# Patient Record
Sex: MALE | Race: BLACK OR AFRICAN AMERICAN | NOT HISPANIC OR LATINO | Employment: FULL TIME | URBAN - METROPOLITAN AREA
[De-identification: names, ages, dates, MRNs, and addresses within clinical notes are randomized per-mention and may not be internally consistent; named-entity substitution may affect disease eponyms.]

---

## 2023-03-17 ENCOUNTER — OFFICE VISIT (OUTPATIENT)
Dept: URGENT CARE | Facility: CLINIC | Age: 33
End: 2023-03-17

## 2023-03-17 VITALS
HEART RATE: 86 BPM | SYSTOLIC BLOOD PRESSURE: 138 MMHG | DIASTOLIC BLOOD PRESSURE: 90 MMHG | HEIGHT: 66 IN | OXYGEN SATURATION: 95 % | RESPIRATION RATE: 16 BRPM | TEMPERATURE: 97.6 F

## 2023-03-17 DIAGNOSIS — Z02.89 ENCOUNTER FOR PHYSICAL EXAMINATION RELATED TO EMPLOYMENT: ICD-10-CM

## 2023-03-17 DIAGNOSIS — R03.0 ELEVATED BLOOD PRESSURE READING: ICD-10-CM

## 2023-03-17 PROCEDURE — 8915 PR COMPREHENSIVE PHYSICAL: Performed by: NURSE PRACTITIONER

## 2023-08-31 ENCOUNTER — NON-PROVIDER VISIT (OUTPATIENT)
Dept: URGENT CARE | Facility: CLINIC | Age: 33
End: 2023-08-31

## 2023-08-31 DIAGNOSIS — Z02.1 PRE-EMPLOYMENT DRUG SCREENING: ICD-10-CM

## 2023-08-31 LAB
AMP AMPHETAMINE: NORMAL
COC COCAINE: NORMAL
INT CON NEG: NORMAL
INT CON POS: NORMAL
MET METHAMPHETAMINES: NORMAL
OPI OPIATES: NORMAL
PCP PHENCYCLIDINE: NORMAL
POC DRUG COMMENT 753798-OCCUPATIONAL HEALTH: NEGATIVE
THC: NORMAL

## 2023-08-31 PROCEDURE — 80305 DRUG TEST PRSMV DIR OPT OBS: CPT | Performed by: NURSE PRACTITIONER

## 2024-01-23 ENCOUNTER — APPOINTMENT (OUTPATIENT)
Dept: RADIOLOGY | Facility: MEDICAL CENTER | Age: 34
DRG: 158 | End: 2024-01-23
Attending: STUDENT IN AN ORGANIZED HEALTH CARE EDUCATION/TRAINING PROGRAM
Payer: COMMERCIAL

## 2024-01-23 ENCOUNTER — HOSPITAL ENCOUNTER (INPATIENT)
Facility: MEDICAL CENTER | Age: 34
LOS: 2 days | DRG: 158 | End: 2024-01-25
Attending: STUDENT IN AN ORGANIZED HEALTH CARE EDUCATION/TRAINING PROGRAM | Admitting: INTERNAL MEDICINE
Payer: COMMERCIAL

## 2024-01-23 ENCOUNTER — HOSPITAL ENCOUNTER (OUTPATIENT)
Dept: RADIOLOGY | Facility: MEDICAL CENTER | Age: 34
End: 2024-01-23

## 2024-01-23 DIAGNOSIS — K04.7 DENTAL INFECTION: ICD-10-CM

## 2024-01-23 DIAGNOSIS — K12.1 BACTERIAL ORAL INFECTION: ICD-10-CM

## 2024-01-23 DIAGNOSIS — B96.89 BACTERIAL ORAL INFECTION: ICD-10-CM

## 2024-01-23 DIAGNOSIS — J98.8 AIRWAY COMPROMISE: ICD-10-CM

## 2024-01-23 DIAGNOSIS — A41.9 SEPSIS, DUE TO UNSPECIFIED ORGANISM, UNSPECIFIED WHETHER ACUTE ORGAN DYSFUNCTION PRESENT (HCC): ICD-10-CM

## 2024-01-23 PROBLEM — E66.01 MORBID OBESITY (HCC): Status: ACTIVE | Noted: 2024-01-23

## 2024-01-23 PROBLEM — R73.9 HYPERGLYCEMIA: Status: ACTIVE | Noted: 2024-01-23

## 2024-01-23 LAB
ALBUMIN SERPL BCP-MCNC: 4.1 G/DL (ref 3.2–4.9)
ALBUMIN/GLOB SERPL: 1.1 G/DL
ALP SERPL-CCNC: 85 U/L (ref 30–99)
ALT SERPL-CCNC: 15 U/L (ref 2–50)
ANION GAP SERPL CALC-SCNC: 11 MMOL/L (ref 7–16)
AST SERPL-CCNC: 11 U/L (ref 12–45)
BASOPHILS # BLD AUTO: 0.2 % (ref 0–1.8)
BASOPHILS # BLD: 0.03 K/UL (ref 0–0.12)
BILIRUB SERPL-MCNC: 0.4 MG/DL (ref 0.1–1.5)
BUN SERPL-MCNC: 11 MG/DL (ref 8–22)
CALCIUM ALBUM COR SERPL-MCNC: 8.8 MG/DL (ref 8.5–10.5)
CALCIUM SERPL-MCNC: 8.9 MG/DL (ref 8.5–10.5)
CHLORIDE SERPL-SCNC: 102 MMOL/L (ref 96–112)
CO2 SERPL-SCNC: 21 MMOL/L (ref 20–33)
CREAT SERPL-MCNC: 0.88 MG/DL (ref 0.5–1.4)
EOSINOPHIL # BLD AUTO: 0.02 K/UL (ref 0–0.51)
EOSINOPHIL NFR BLD: 0.1 % (ref 0–6.9)
ERYTHROCYTE [DISTWIDTH] IN BLOOD BY AUTOMATED COUNT: 38.8 FL (ref 35.9–50)
FLUAV RNA SPEC QL NAA+PROBE: NEGATIVE
FLUBV RNA SPEC QL NAA+PROBE: NEGATIVE
GFR SERPLBLD CREATININE-BSD FMLA CKD-EPI: 115 ML/MIN/1.73 M 2
GLOBULIN SER CALC-MCNC: 3.7 G/DL (ref 1.9–3.5)
GLUCOSE SERPL-MCNC: 111 MG/DL (ref 65–99)
HCT VFR BLD AUTO: 44.7 % (ref 42–52)
HGB BLD-MCNC: 15.5 G/DL (ref 14–18)
IMM GRANULOCYTES # BLD AUTO: 0.1 K/UL (ref 0–0.11)
IMM GRANULOCYTES NFR BLD AUTO: 0.6 % (ref 0–0.9)
LACTATE SERPL-SCNC: 1 MMOL/L (ref 0.5–2)
LYMPHOCYTES # BLD AUTO: 1.45 K/UL (ref 1–4.8)
LYMPHOCYTES NFR BLD: 8.9 % (ref 22–41)
MCH RBC QN AUTO: 30.2 PG (ref 27–33)
MCHC RBC AUTO-ENTMCNC: 34.7 G/DL (ref 32.3–36.5)
MCV RBC AUTO: 87 FL (ref 81.4–97.8)
MONOCYTES # BLD AUTO: 0.24 K/UL (ref 0–0.85)
MONOCYTES NFR BLD AUTO: 1.5 % (ref 0–13.4)
NEUTROPHILS # BLD AUTO: 14.49 K/UL (ref 1.82–7.42)
NEUTROPHILS NFR BLD: 88.7 % (ref 44–72)
NRBC # BLD AUTO: 0 K/UL
NRBC BLD-RTO: 0 /100 WBC (ref 0–0.2)
PLATELET # BLD AUTO: 229 K/UL (ref 164–446)
PMV BLD AUTO: 11.4 FL (ref 9–12.9)
POTASSIUM SERPL-SCNC: 4 MMOL/L (ref 3.6–5.5)
PROT SERPL-MCNC: 7.8 G/DL (ref 6–8.2)
RBC # BLD AUTO: 5.14 M/UL (ref 4.7–6.1)
RSV RNA SPEC QL NAA+PROBE: NEGATIVE
SARS-COV-2 RNA RESP QL NAA+PROBE: NOTDETECTED
SODIUM SERPL-SCNC: 134 MMOL/L (ref 135–145)
WBC # BLD AUTO: 16.3 K/UL (ref 4.8–10.8)

## 2024-01-23 PROCEDURE — 96365 THER/PROPH/DIAG IV INF INIT: CPT

## 2024-01-23 PROCEDURE — 99285 EMERGENCY DEPT VISIT HI MDM: CPT

## 2024-01-23 PROCEDURE — 83605 ASSAY OF LACTIC ACID: CPT

## 2024-01-23 PROCEDURE — 85025 COMPLETE CBC W/AUTO DIFF WBC: CPT

## 2024-01-23 PROCEDURE — 71045 X-RAY EXAM CHEST 1 VIEW: CPT

## 2024-01-23 PROCEDURE — 99223 1ST HOSP IP/OBS HIGH 75: CPT | Performed by: INTERNAL MEDICINE

## 2024-01-23 PROCEDURE — 700111 HCHG RX REV CODE 636 W/ 250 OVERRIDE (IP): Mod: JZ | Performed by: STUDENT IN AN ORGANIZED HEALTH CARE EDUCATION/TRAINING PROGRAM

## 2024-01-23 PROCEDURE — 36415 COLL VENOUS BLD VENIPUNCTURE: CPT

## 2024-01-23 PROCEDURE — 70355 PANORAMIC X-RAY OF JAWS: CPT

## 2024-01-23 PROCEDURE — 700105 HCHG RX REV CODE 258: Performed by: STUDENT IN AN ORGANIZED HEALTH CARE EDUCATION/TRAINING PROGRAM

## 2024-01-23 PROCEDURE — 0241U HCHG SARS-COV-2 COVID-19 NFCT DS RESP RNA 4 TRGT ED POC: CPT

## 2024-01-23 PROCEDURE — 770000 HCHG ROOM/CARE - INTERMEDIATE ICU *

## 2024-01-23 PROCEDURE — 80053 COMPREHEN METABOLIC PANEL: CPT

## 2024-01-23 PROCEDURE — 87040 BLOOD CULTURE FOR BACTERIA: CPT

## 2024-01-23 RX ORDER — ONDANSETRON 4 MG/1
4 TABLET, ORALLY DISINTEGRATING ORAL EVERY 4 HOURS PRN
Status: DISCONTINUED | OUTPATIENT
Start: 2024-01-23 | End: 2024-01-25 | Stop reason: HOSPADM

## 2024-01-23 RX ORDER — BISACODYL 10 MG
10 SUPPOSITORY, RECTAL RECTAL
Status: DISCONTINUED | OUTPATIENT
Start: 2024-01-23 | End: 2024-01-25 | Stop reason: HOSPADM

## 2024-01-23 RX ORDER — DEXAMETHASONE SODIUM PHOSPHATE 4 MG/ML
4 INJECTION, SOLUTION INTRA-ARTICULAR; INTRALESIONAL; INTRAMUSCULAR; INTRAVENOUS; SOFT TISSUE EVERY 6 HOURS
Status: DISCONTINUED | OUTPATIENT
Start: 2024-01-24 | End: 2024-01-25 | Stop reason: HOSPADM

## 2024-01-23 RX ORDER — IBUPROFEN 200 MG
400 TABLET ORAL EVERY 6 HOURS PRN
Status: DISCONTINUED | OUTPATIENT
Start: 2024-01-23 | End: 2024-01-23

## 2024-01-23 RX ORDER — SODIUM CHLORIDE 9 MG/ML
INJECTION, SOLUTION INTRAVENOUS CONTINUOUS
Status: DISCONTINUED | OUTPATIENT
Start: 2024-01-23 | End: 2024-01-24

## 2024-01-23 RX ORDER — PROMETHAZINE HYDROCHLORIDE 25 MG/1
12.5-25 TABLET ORAL EVERY 4 HOURS PRN
Status: DISCONTINUED | OUTPATIENT
Start: 2024-01-23 | End: 2024-01-25 | Stop reason: HOSPADM

## 2024-01-23 RX ORDER — POLYETHYLENE GLYCOL 3350 17 G/17G
1 POWDER, FOR SOLUTION ORAL
Status: DISCONTINUED | OUTPATIENT
Start: 2024-01-23 | End: 2024-01-25 | Stop reason: HOSPADM

## 2024-01-23 RX ORDER — PROMETHAZINE HYDROCHLORIDE 25 MG/1
12.5-25 SUPPOSITORY RECTAL EVERY 4 HOURS PRN
Status: DISCONTINUED | OUTPATIENT
Start: 2024-01-23 | End: 2024-01-25 | Stop reason: HOSPADM

## 2024-01-23 RX ORDER — HYDROMORPHONE HYDROCHLORIDE 1 MG/ML
0.5 INJECTION, SOLUTION INTRAMUSCULAR; INTRAVENOUS; SUBCUTANEOUS
Status: DISCONTINUED | OUTPATIENT
Start: 2024-01-23 | End: 2024-01-25 | Stop reason: HOSPADM

## 2024-01-23 RX ORDER — ONDANSETRON 2 MG/ML
4 INJECTION INTRAMUSCULAR; INTRAVENOUS EVERY 4 HOURS PRN
Status: DISCONTINUED | OUTPATIENT
Start: 2024-01-23 | End: 2024-01-25 | Stop reason: HOSPADM

## 2024-01-23 RX ORDER — ENOXAPARIN SODIUM 100 MG/ML
60 INJECTION SUBCUTANEOUS 2 TIMES DAILY
Status: DISCONTINUED | OUTPATIENT
Start: 2024-01-24 | End: 2024-01-25 | Stop reason: HOSPADM

## 2024-01-23 RX ORDER — PROCHLORPERAZINE EDISYLATE 5 MG/ML
5-10 INJECTION INTRAMUSCULAR; INTRAVENOUS EVERY 4 HOURS PRN
Status: DISCONTINUED | OUTPATIENT
Start: 2024-01-23 | End: 2024-01-25 | Stop reason: HOSPADM

## 2024-01-23 RX ORDER — SODIUM CHLORIDE 9 MG/ML
30 INJECTION, SOLUTION INTRAVENOUS ONCE
Status: COMPLETED | OUTPATIENT
Start: 2024-01-23 | End: 2024-01-23

## 2024-01-23 RX ORDER — LABETALOL HYDROCHLORIDE 5 MG/ML
10 INJECTION, SOLUTION INTRAVENOUS EVERY 4 HOURS PRN
Status: DISCONTINUED | OUTPATIENT
Start: 2024-01-23 | End: 2024-01-25 | Stop reason: HOSPADM

## 2024-01-23 RX ORDER — ACETAMINOPHEN 500 MG
500 TABLET ORAL EVERY 6 HOURS PRN
COMMUNITY
End: 2024-02-08

## 2024-01-23 RX ORDER — OXYCODONE HYDROCHLORIDE 10 MG/1
10 TABLET ORAL
Status: DISCONTINUED | OUTPATIENT
Start: 2024-01-23 | End: 2024-01-25 | Stop reason: HOSPADM

## 2024-01-23 RX ORDER — AMOXICILLIN 250 MG
2 CAPSULE ORAL 2 TIMES DAILY
Status: DISCONTINUED | OUTPATIENT
Start: 2024-01-23 | End: 2024-01-25 | Stop reason: HOSPADM

## 2024-01-23 RX ORDER — ACETAMINOPHEN 325 MG/1
650 TABLET ORAL EVERY 6 HOURS PRN
Status: DISCONTINUED | OUTPATIENT
Start: 2024-01-23 | End: 2024-01-25 | Stop reason: HOSPADM

## 2024-01-23 RX ORDER — OXYCODONE HYDROCHLORIDE 5 MG/1
5 TABLET ORAL
Status: DISCONTINUED | OUTPATIENT
Start: 2024-01-23 | End: 2024-01-25 | Stop reason: HOSPADM

## 2024-01-23 RX ORDER — IBUPROFEN 200 MG
400 TABLET ORAL EVERY 6 HOURS PRN
COMMUNITY
End: 2024-02-08

## 2024-01-23 RX ADMIN — AMPICILLIN AND SULBACTAM 3 G: 1; 2 INJECTION, POWDER, FOR SOLUTION INTRAMUSCULAR; INTRAVENOUS at 21:47

## 2024-01-23 RX ADMIN — SODIUM CHLORIDE 1914 ML: 9 INJECTION, SOLUTION INTRAVENOUS at 21:47

## 2024-01-23 ASSESSMENT — ENCOUNTER SYMPTOMS
COUGH: 0
HALLUCINATIONS: 0
NAUSEA: 0
FEVER: 0
PALPITATIONS: 0
PHOTOPHOBIA: 0
FOCAL WEAKNESS: 0
SORE THROAT: 1
SPEECH CHANGE: 0
WEIGHT LOSS: 0
SPUTUM PRODUCTION: 0
POLYDIPSIA: 0
TREMORS: 0
VOMITING: 0
NERVOUS/ANXIOUS: 0
HEMOPTYSIS: 0
CHILLS: 0
BRUISES/BLEEDS EASILY: 0
NECK PAIN: 0
BACK PAIN: 0
HEADACHES: 0
HEARTBURN: 0
DOUBLE VISION: 0
BLURRED VISION: 0
ORTHOPNEA: 0
FLANK PAIN: 0

## 2024-01-23 ASSESSMENT — FIBROSIS 4 INDEX: FIB4 SCORE: 0.42

## 2024-01-23 ASSESSMENT — LIFESTYLE VARIABLES: SUBSTANCE_ABUSE: 0

## 2024-01-24 ENCOUNTER — APPOINTMENT (OUTPATIENT)
Dept: RADIOLOGY | Facility: MEDICAL CENTER | Age: 34
DRG: 158 | End: 2024-01-24
Attending: DENTIST
Payer: COMMERCIAL

## 2024-01-24 LAB
ALBUMIN SERPL BCP-MCNC: 3.8 G/DL (ref 3.2–4.9)
ALBUMIN/GLOB SERPL: 1.2 G/DL
ALP SERPL-CCNC: 75 U/L (ref 30–99)
ALT SERPL-CCNC: 15 U/L (ref 2–50)
ANION GAP SERPL CALC-SCNC: 10 MMOL/L (ref 7–16)
APPEARANCE UR: ABNORMAL
AST SERPL-CCNC: 9 U/L (ref 12–45)
BACTERIA #/AREA URNS HPF: ABNORMAL /HPF
BASOPHILS # BLD AUTO: 0.2 % (ref 0–1.8)
BASOPHILS # BLD: 0.03 K/UL (ref 0–0.12)
BILIRUB SERPL-MCNC: 0.3 MG/DL (ref 0.1–1.5)
BILIRUB UR QL STRIP.AUTO: NEGATIVE
BUN SERPL-MCNC: 12 MG/DL (ref 8–22)
CALCIUM ALBUM COR SERPL-MCNC: 8.5 MG/DL (ref 8.5–10.5)
CALCIUM SERPL-MCNC: 8.3 MG/DL (ref 8.5–10.5)
CHLORIDE SERPL-SCNC: 104 MMOL/L (ref 96–112)
CO2 SERPL-SCNC: 20 MMOL/L (ref 20–33)
COLOR UR: YELLOW
CREAT SERPL-MCNC: 0.75 MG/DL (ref 0.5–1.4)
EOSINOPHIL # BLD AUTO: 0 K/UL (ref 0–0.51)
EOSINOPHIL NFR BLD: 0 % (ref 0–6.9)
EPI CELLS #/AREA URNS HPF: ABNORMAL /HPF
ERYTHROCYTE [DISTWIDTH] IN BLOOD BY AUTOMATED COUNT: 38.5 FL (ref 35.9–50)
EST. AVERAGE GLUCOSE BLD GHB EST-MCNC: 123 MG/DL
GFR SERPLBLD CREATININE-BSD FMLA CKD-EPI: 121 ML/MIN/1.73 M 2
GLOBULIN SER CALC-MCNC: 3.2 G/DL (ref 1.9–3.5)
GLUCOSE SERPL-MCNC: 129 MG/DL (ref 65–99)
GLUCOSE UR STRIP.AUTO-MCNC: NEGATIVE MG/DL
HBA1C MFR BLD: 5.9 % (ref 4–5.6)
HCT VFR BLD AUTO: 42.4 % (ref 42–52)
HGB BLD-MCNC: 14.4 G/DL (ref 14–18)
HYALINE CASTS #/AREA URNS LPF: ABNORMAL /LPF
IMM GRANULOCYTES # BLD AUTO: 0.1 K/UL (ref 0–0.11)
IMM GRANULOCYTES NFR BLD AUTO: 0.6 % (ref 0–0.9)
KETONES UR STRIP.AUTO-MCNC: ABNORMAL MG/DL
LACTATE SERPL-SCNC: 0.9 MMOL/L (ref 0.5–2)
LEUKOCYTE ESTERASE UR QL STRIP.AUTO: ABNORMAL
LYMPHOCYTES # BLD AUTO: 1.26 K/UL (ref 1–4.8)
LYMPHOCYTES NFR BLD: 7 % (ref 22–41)
MCH RBC QN AUTO: 29.6 PG (ref 27–33)
MCHC RBC AUTO-ENTMCNC: 34 G/DL (ref 32.3–36.5)
MCV RBC AUTO: 87.2 FL (ref 81.4–97.8)
MICRO URNS: ABNORMAL
MONOCYTES # BLD AUTO: 0.2 K/UL (ref 0–0.85)
MONOCYTES NFR BLD AUTO: 1.1 % (ref 0–13.4)
NEUTROPHILS # BLD AUTO: 16.45 K/UL (ref 1.82–7.42)
NEUTROPHILS NFR BLD: 91.1 % (ref 44–72)
NITRITE UR QL STRIP.AUTO: NEGATIVE
NRBC # BLD AUTO: 0 K/UL
NRBC BLD-RTO: 0 /100 WBC (ref 0–0.2)
PH UR STRIP.AUTO: 6 [PH] (ref 5–8)
PLATELET # BLD AUTO: 208 K/UL (ref 164–446)
PMV BLD AUTO: 11.4 FL (ref 9–12.9)
POTASSIUM SERPL-SCNC: 4 MMOL/L (ref 3.6–5.5)
PROT SERPL-MCNC: 7 G/DL (ref 6–8.2)
PROT UR QL STRIP: 30 MG/DL
RBC # BLD AUTO: 4.86 M/UL (ref 4.7–6.1)
RBC # URNS HPF: ABNORMAL /HPF
RBC UR QL AUTO: ABNORMAL
SODIUM SERPL-SCNC: 134 MMOL/L (ref 135–145)
SP GR UR STRIP.AUTO: >=1.045
UROBILINOGEN UR STRIP.AUTO-MCNC: 0.2 MG/DL
WBC # BLD AUTO: 18 K/UL (ref 4.8–10.8)
WBC #/AREA URNS HPF: ABNORMAL /HPF

## 2024-01-24 PROCEDURE — 80053 COMPREHEN METABOLIC PANEL: CPT

## 2024-01-24 PROCEDURE — 83036 HEMOGLOBIN GLYCOSYLATED A1C: CPT

## 2024-01-24 PROCEDURE — 83605 ASSAY OF LACTIC ACID: CPT

## 2024-01-24 PROCEDURE — 87086 URINE CULTURE/COLONY COUNT: CPT

## 2024-01-24 PROCEDURE — 700105 HCHG RX REV CODE 258: Performed by: INTERNAL MEDICINE

## 2024-01-24 PROCEDURE — 81001 URINALYSIS AUTO W/SCOPE: CPT

## 2024-01-24 PROCEDURE — 770006 HCHG ROOM/CARE - MED/SURG/GYN SEMI*

## 2024-01-24 PROCEDURE — 70355 PANORAMIC X-RAY OF JAWS: CPT

## 2024-01-24 PROCEDURE — 700111 HCHG RX REV CODE 636 W/ 250 OVERRIDE (IP): Mod: JZ | Performed by: INTERNAL MEDICINE

## 2024-01-24 PROCEDURE — 85025 COMPLETE CBC W/AUTO DIFF WBC: CPT

## 2024-01-24 PROCEDURE — 99232 SBSQ HOSP IP/OBS MODERATE 35: CPT | Performed by: HOSPITALIST

## 2024-01-24 RX ADMIN — DEXAMETHASONE SODIUM PHOSPHATE 4 MG: 4 INJECTION INTRA-ARTICULAR; INTRALESIONAL; INTRAMUSCULAR; INTRAVENOUS; SOFT TISSUE at 16:23

## 2024-01-24 RX ADMIN — DEXAMETHASONE SODIUM PHOSPHATE 4 MG: 4 INJECTION INTRA-ARTICULAR; INTRALESIONAL; INTRAMUSCULAR; INTRAVENOUS; SOFT TISSUE at 05:14

## 2024-01-24 RX ADMIN — DEXAMETHASONE SODIUM PHOSPHATE 4 MG: 4 INJECTION INTRA-ARTICULAR; INTRALESIONAL; INTRAMUSCULAR; INTRAVENOUS; SOFT TISSUE at 23:24

## 2024-01-24 RX ADMIN — ENOXAPARIN SODIUM 60 MG: 100 INJECTION SUBCUTANEOUS at 05:14

## 2024-01-24 RX ADMIN — AMPICILLIN SODIUM, SULBACTAM SODIUM 3 G: 2; 1 INJECTION, POWDER, FOR SOLUTION INTRAMUSCULAR; INTRAVENOUS at 10:02

## 2024-01-24 RX ADMIN — AMPICILLIN SODIUM, SULBACTAM SODIUM 3 G: 2; 1 INJECTION, POWDER, FOR SOLUTION INTRAMUSCULAR; INTRAVENOUS at 22:11

## 2024-01-24 RX ADMIN — AMPICILLIN SODIUM, SULBACTAM SODIUM 3 G: 2; 1 INJECTION, POWDER, FOR SOLUTION INTRAMUSCULAR; INTRAVENOUS at 16:23

## 2024-01-24 RX ADMIN — ENOXAPARIN SODIUM 60 MG: 100 INJECTION SUBCUTANEOUS at 16:23

## 2024-01-24 RX ADMIN — SODIUM CHLORIDE: 9 INJECTION, SOLUTION INTRAVENOUS at 00:07

## 2024-01-24 RX ADMIN — DEXAMETHASONE SODIUM PHOSPHATE 4 MG: 4 INJECTION INTRA-ARTICULAR; INTRALESIONAL; INTRAMUSCULAR; INTRAVENOUS; SOFT TISSUE at 00:07

## 2024-01-24 RX ADMIN — AMPICILLIN SODIUM, SULBACTAM SODIUM 3 G: 2; 1 INJECTION, POWDER, FOR SOLUTION INTRAMUSCULAR; INTRAVENOUS at 03:35

## 2024-01-24 RX ADMIN — DEXAMETHASONE SODIUM PHOSPHATE 4 MG: 4 INJECTION INTRA-ARTICULAR; INTRALESIONAL; INTRAMUSCULAR; INTRAVENOUS; SOFT TISSUE at 12:32

## 2024-01-24 ASSESSMENT — ENCOUNTER SYMPTOMS
CHILLS: 0
ABDOMINAL PAIN: 0
VOMITING: 0
COUGH: 0
SHORTNESS OF BREATH: 0
FEVER: 0
NAUSEA: 0
SORE THROAT: 1

## 2024-01-24 ASSESSMENT — COGNITIVE AND FUNCTIONAL STATUS - GENERAL
DAILY ACTIVITIY SCORE: 24
SUGGESTED CMS G CODE MODIFIER DAILY ACTIVITY: CH
SUGGESTED CMS G CODE MODIFIER MOBILITY: CH
MOBILITY SCORE: 24

## 2024-01-24 ASSESSMENT — LIFESTYLE VARIABLES
DOES PATIENT WANT TO STOP DRINKING: NO
ON A TYPICAL DAY WHEN YOU DRINK ALCOHOL HOW MANY DRINKS DO YOU HAVE: 0
EVER HAD A DRINK FIRST THING IN THE MORNING TO STEADY YOUR NERVES TO GET RID OF A HANGOVER: NO
HAVE YOU EVER FELT YOU SHOULD CUT DOWN ON YOUR DRINKING: NO
EVER FELT BAD OR GUILTY ABOUT YOUR DRINKING: NO
CONSUMPTION TOTAL: NEGATIVE
HAVE PEOPLE ANNOYED YOU BY CRITICIZING YOUR DRINKING: NO
HOW MANY TIMES IN THE PAST YEAR HAVE YOU HAD 5 OR MORE DRINKS IN A DAY: 0
AVERAGE NUMBER OF DAYS PER WEEK YOU HAVE A DRINK CONTAINING ALCOHOL: 0
ALCOHOL_USE: NO
TOTAL SCORE: 0

## 2024-01-24 ASSESSMENT — PAIN DESCRIPTION - PAIN TYPE
TYPE: ACUTE PAIN

## 2024-01-24 ASSESSMENT — PATIENT HEALTH QUESTIONNAIRE - PHQ9
1. LITTLE INTEREST OR PLEASURE IN DOING THINGS: NOT AT ALL
2. FEELING DOWN, DEPRESSED, IRRITABLE, OR HOPELESS: NOT AT ALL
SUM OF ALL RESPONSES TO PHQ9 QUESTIONS 1 AND 2: 0

## 2024-01-24 ASSESSMENT — FIBROSIS 4 INDEX: FIB4 SCORE: 0.42

## 2024-01-24 NOTE — PROGRESS NOTES
4 Eyes Skin Assessment Completed by LEDA Murray and ELDA Smith.    Head WDL  Ears WDL  Nose WDL  Mouth WDL  Neck WDL  Breast/Chest WDL  Shoulder Blades WDL  Spine WDL  (R) Arm/Elbow/Hand WDL  (L) Arm/Elbow/Hand WDL  Abdomen WDL  Groin WDL  Scrotum/Coccyx/Buttocks WDL  (R) Leg WDL  (L) Leg WDL  (R) Heel/Foot/Toe WDL  (L) Heel/Foot/Toe WDL          Devices In Places ECG, Tele Box, Blood Pressure Cuff, and Pulse Ox      Interventions In Place Pillows and Low Air Loss Mattress    Possible Skin Injury No    Pictures Uploaded Into Epic N/A  Wound Consult Placed N/A  RN Wound Prevention Protocol Ordered No

## 2024-01-24 NOTE — ED NOTES
Pharmacy Medication Reconciliation      ~Medication reconciliation updated and complete per patient at bedside.   ~Allergies have been verified and updated   ~No oral ABX within the last 30 days  ~Patient home pharmacy :  [facility]      ~Anticoagulants (rivaroxaban, apixaban, edoxaban, dabigatran, warfarin, enoxaparin) taken in the last 14 days? NO  ~Anticoagulant: N/A Last dose: N/A

## 2024-01-24 NOTE — PROGRESS NOTES
"S: Pt admitted last PM. Reviewed plain films and CT difficult to see dental origin. Panoramic film is un diagnostic due to tooth overlap.     O: BP (!) 141/63   Pulse 86   Temp 36.1 °C (96.9 °F) (Temporal)   Resp (!) 31   Ht 1.676 m (5' 6\")   Wt (!) 160 kg (353 lb 6.4 oz)   SpO2 96%   Recent Labs     01/23/24  2141 01/24/24  0335   WBC 16.3* 18.0*   RBC 5.14 4.86   HEMOGLOBIN 15.5 14.4   HEMATOCRIT 44.7 42.4   MCV 87.0 87.2   MCH 30.2 29.6   MCHC 34.7 34.0   RDW 38.8 38.5   PLATELETCT 229 208   MPV 11.4 11.4     Recent Labs     01/23/24 2141 01/24/24  0335   SODIUM 134* 134*   POTASSIUM 4.0 4.0   CHLORIDE 102 104   CO2 21 20   GLUCOSE 111* 129*   BUN 11 12       A:   Active Hospital Problems    Diagnosis     Dental infection with SIRS and airway edema [K04.7]     Morbid obesity (HCC) [E66.01]     Hyperglycemia [R73.9]        P: Recommend repeat of panoramic with better separation of teeth. Patient needs to be better positioned while film is taken to allow this. This may be difficult due to patient size and limited mobility. Will evaluate patient at bedside later today once film repeated  "

## 2024-01-24 NOTE — CARE PLAN
The patient is Watcher - Medium risk of patient condition declining or worsening    Shift Goals  Clinical Goals: Monitor airway, suction  Patient Goals: Alert staff to any trouble breathing  Family Goals: nato    Progress made toward(s) clinical / shift goals:    Problem: Knowledge Deficit - Standard  Goal: Patient and family/care givers will demonstrate understanding of plan of care, disease process/condition, diagnostic tests and medications  Outcome: Progressing     Problem: Pain - Standard  Goal: Alleviation of pain or a reduction in pain to the patient’s comfort goal  Outcome: Progressing

## 2024-01-24 NOTE — ED NOTES
2nd bag of NS hung and placed on pressure bag. Pt resting with even chest rise and fall, reports no needs at this time, call light available and in reach.

## 2024-01-24 NOTE — CONSULTS
DATE OF SERVICE:  01/24/2024     PRINCIPAL CONCERN: Tonsillitis with possible peritonsillar abscess.     HISTORY OF PRESENT ILLNESS:  This is a 33-year-old male who was admitted last   night from outside [facility] with concern of peritonsillar abscess.  He   complains of throat swelling and dental pain on the left side the last 2-3   days.  CT of the neck, there was concern for peritonsillar abscess, but   reviewing, there was concern of possible infection with diffuse edema and   swelling of the pharynx.  Dr. Fierro did give some recommendations, but he was   admitted to intermediate care just for airway. Secondarily, he is quite   overweight.  On initial exam, I saw him at the bedside.  He is lying in the   bed.  He actually states that he feels much better this morning and can open   his mouth wider.  He is currently afebrile at 96.9 with a pulse of 101, blood   pressure 132/27, and 93% on room air.     PAST MEDICAL HISTORY:  Otherwise, negative.     MEDICATIONS:  He does not take anything day to day.     PHYSICAL EXAMINATION:    GENERAL:  As mentioned, he is lying in bed.  He is notably obese.  HEENT:  Both ears are grossly normal.  The nose is patent.  The mouth is pink,   moist.  He does have enlarged tonsils, but no trismus.  He has a lot of   redundant oral and pharyngeal tissue, but no obstruction of the airway.  NECK:  A little bit tender with some edema, but once again, notably enlarged   secondary to his weight.     IMPRESSION:  Tonsillitis.  He does look like he is much better.  I reviewed   the CT scan that shows diffuse edema and no obvious abscess. I do recommend   that he be kept for at least another day for IV antibiotics just to make sure   this improves.  I do not recommend any other intervention at this time and he   can follow up with me as needed.        ______________________________  MD ANKIT Finn/MACY    DD:  01/24/2024 09:27  DT:  01/24/2024 10:21    Job#:  [id number]

## 2024-01-24 NOTE — ASSESSMENT & PLAN NOTE
33-year-old male with history of morbid obesity, presented with throat swelling, dental pain, difficulties with salivation clearing secretions  CT is neg for fluid collection/abscess  Pt is handling secretions  No stridor  Able to open 2 knuckle widths  Sufjectively feels like he's doing better  Cont 24hrs of IV Abx's and steroids  Probable DC in am if doing well    1/25 tonsillitis per DR. Gorman, rec cont abx  F/u labs

## 2024-01-24 NOTE — ED NOTES
Pt being taken upstairs at this time by RN via yosvany. Pt is awake and alert, talking to staff, in no apparent distress at time of transfer. Pt's paperwork and belongings sent upstairs with pt and RNS.

## 2024-01-24 NOTE — ED PROVIDER NOTES
"ED Provider Note    CHIEF COMPLAINT  Chief Complaint   Patient presents with    Abscess     BIB [facility] from [facility] for peritonsillar abscess.Unable to control secretions.        EXTERNAL RECORDS REVIEWED  External ED Note patient transferred to this facility for suspected peritonsillar abscess    HPI/ROS  LIMITATION TO HISTORY   Select: : None  OUTSIDE HISTORIAN(S):  EMS transported patient for concern for peritonsillar abscess    Ramakrishna Menezes Jr. is a 33 y.o. male who presents with oropharyngeal swelling, difficulty rating secretions, difficulty breathing.  Patient reports flulike illness for the past several days and then today had significant throat pain and difficulty breathing.  Patient denies nausea vomiting diarrhea.  Patient reports the pain is mostly on the left side.  Patient does endorse some dental pain as well.  Patient was transferred here for ENT evaluation for suspected peritonsillar abscess given CT imaging.    PAST MEDICAL HISTORY       SURGICAL HISTORY  patient denies any surgical history    FAMILY HISTORY  History reviewed. No pertinent family history.    SOCIAL HISTORY  Social History     Tobacco Use    Smoking status: Never    Smokeless tobacco: Never   Vaping Use    Vaping Use: Never used   Substance and Sexual Activity    Alcohol use: Not Currently    Drug use: Never    Sexual activity: Not on file       CURRENT MEDICATIONS  Home Medications    **Home medications have not yet been reviewed for this encounter**         ALLERGIES  No Known Allergies    PHYSICAL EXAM  VITAL SIGNS: Pulse 97   Temp 36.9 °C (98.5 °F) (Temporal)   Resp 16   Ht 1.676 m (5' 6\")   Wt (!) 153 kg (337 lb 4.9 oz)   SpO2 96%   BMI 54.44 kg/m²    Vitals and nursing note reviewed.   Constitutional:       Comments: Patient is lying in bed supine, pleasant, conversant, speaking in complete sentences   HENT:   Significant posterior oropharyngeal swelling, left greater than right, " mild dental disease, dental fractures on the right lower premolars, tenderness in the left upper premolars, minimal sublingual elevation, no significant neck swelling but significant lymphadenopathy, patient protecting his airway but tenuous.  No stridor.  Eyes:      Extraocular Movements: Extraocular movements intact.      Conjunctiva/sclera: Conjunctivae normal.      Pupils: Pupils are equal, round, and reactive to light.   Cardiovascular:      Pulses: Normal pulses.      Comments: HR 97  Pulmonary:      Effort: Pulmonary effort is normal. No respiratory distress.   Musculoskeletal:         General: No swelling. Normal range of motion.      Cervical back: Normal range of motion. No rigidity.   Skin:     General: Skin is warm and dry.      Capillary Refill: Capillary refill takes less than 2 seconds.   Neurological:      Mental Status: Alert.       DIAGNOSTIC STUDIES / PROCEDURES    RADIOLOGY  I have independently interpreted the diagnostic imaging associated with this visit and am waiting the final reading from the radiologist.   My preliminary interpretation is as follows: No pneumonia, pneumothorax, pulmonary edema  Radiologist interpretation: No pneumonia, pneumothorax, pulmonary edema    COURSE & MEDICAL DECISION MAKING      INITIAL ASSESSMENT, COURSE AND PLAN  Care Narrative: Dr. Gorman was consulted to the ENT and reviewed the images and does not believe this is a peritonsillar abscess but believes this is from a dental source.  This does corroborate with the patient's dental pain.  She recommended oral surgery consultation, admission, IV antibiotics and IV steroids.  Patient was then consulted to Dr. Garcia who recommends admission as well and oral surgery and evaluation in the morning for potential operative intervention.  He also recommended Panorex.  Septic workup ordered as well.  ICU consulted for potential IMCU stepdown due to tenuous airway.    Electronically signed by: Tobi Gilliam M.D.,  1/23/2024 9:15 PM    Patient evaluated by intensivist who believes patient is stable for IMCU.  Patient started on IV antibiotics.  Septic workup ongoing as well.  Patient will be admitted to IMCU, hospitalist has been consulted as well.    This dictation has been created using voice recognition software. I am continuously working with the software to minimize the number of voice recognition errors and I have made every attempt to manually correct the errors within my dictation. However errors  related to this voice recognition software may still exist and should be interpreted within the appropriate context.     Electronically signed by: Tobi Gilliam M.D., 1/23/2024 9:40 PM      CRITICAL CARE  The very real possibilty of a deterioration of this patient's condition required the highest level of my preparedness for sudden, emergent intervention.  I provided critical care services, which included medication orders, frequent reevaluations of the patient's condition and response to treatment, ordering and reviewing test results, and discussing the case with various consultants.  The critical care time associated with the care of the patient was 36 minutes. Review chart for interventions. This time is exclusive of any other billable procedures.         DISPOSITION AND DISCUSSIONS  I have discussed management of the patient with the following physicians and RICKIE's:  Dr Dean, Dr Gorman, Dr Nieves, Dr Garcia    FINAL DIAGNOSIS  1. Airway compromise    2. Dental infection    3. Sepsis, due to unspecified organism, unspecified whether acute organ dysfunction present (HCC)    4. Bacterial oral infection           Electronically signed by: Tobi Gilliam M.D., 1/23/2024 9:11 PM

## 2024-01-24 NOTE — ED TRIAGE NOTES
Ramakrishna Menezes Jr.  33 y.o. male  Chief Complaint   Patient presents with    Abscess     BIB [facility] from [facility] for peritonsillar abscess.Unable to control secretions.      Pt ambulatory with steady gait to triage for above complaint. PTA pt received dexamethasone, zosyn, toradol, and 1L fluid. ENT aware of pt transfer per outlying facility. 20LAC, 18RAC PTA. Pt arrives GCS 15. AxO 4.     Pt is alert, oriented, and follows commands. Pt speaking in full sentences and responds appropriately to questions. No acute distress noted in triage and respirations are even and unlabored.     Pt to blue 17, accepting ERP Gilliam aware of pt arrival.

## 2024-01-24 NOTE — H&P
Hospital Medicine History & Physical Note    Date of Service  1/23/2024    Primary Care Physician  Pcp Pt States None      Code Status  Full Code    Chief Complaint  Chief Complaint   Patient presents with    Abscess     BIB [facility] from [facility] ER for peritonsillar abscess.Unable to control secretions.        History of Presenting Illness  Ramakrishna Menezes Jr. is a 33 y.o. male with history of obesity who presented 1/23/2024 with concern for peritonsillar abscess.  He was transferred from [facility] ER.    He complains of throat swelling and dental pain on the left side, difficulty is clearing secretions, shortness of breath, chills, fever in the last 2 to 3 days.  CT of the neck was suggestive for peritonsillar abscess.  Upon transfer patient here, Dr. Gorman/ENT reviewed imaging and determined that current infection has dental source.  Dr. Garcia/oral surgery consulted, recommendations pending.  Dianne/critical care consulted and agreed for admission to IMCU for possible impending airway compromise.  Currently no concern for airway patency.  He was started on Decadron and Unasyn and reports improvement.        I discussed the plan of care with patient and ERP .    Review of Systems  Review of Systems   Constitutional:  Negative for chills, fever and weight loss.   HENT:  Positive for sore throat. Negative for ear pain, hearing loss and tinnitus.         Swelling   Eyes:  Negative for blurred vision, double vision and photophobia.   Respiratory:  Negative for cough, hemoptysis and sputum production.    Cardiovascular:  Negative for chest pain, palpitations and orthopnea.   Gastrointestinal:  Negative for heartburn, nausea and vomiting.   Genitourinary:  Negative for dysuria, flank pain, frequency and hematuria.   Musculoskeletal:  Negative for back pain, joint pain and neck pain.   Skin:  Negative for itching and rash.   Neurological:  Negative for tremors,  speech change, focal weakness and headaches.   Endo/Heme/Allergies:  Negative for environmental allergies and polydipsia. Does not bruise/bleed easily.   Psychiatric/Behavioral:  Negative for hallucinations and substance abuse. The patient is not nervous/anxious.        Past Medical History   has no past medical history on file.    Surgical History   has no past surgical history on file.     Family History  family history is not on file.   Family history reviewed with patient. There is no family history that is pertinent to the chief complaint.     Social History   reports that he has never smoked. He has never used smokeless tobacco. He reports that he does not currently use alcohol. He reports that he does not use drugs.    Allergies  No Known Allergies    Medications  Prior to Admission Medications   Prescriptions Last Dose Informant Patient Reported? Taking?   acetaminophen (TYLENOL) 500 MG Tab 1/23/2024 at AFTERNOON Patient Yes Yes   Sig: Take 500 mg by mouth every 6 hours as needed for Mild Pain or Moderate Pain.   ibuprofen (MOTRIN) 200 MG Tab 1/22/2024 at UNK Patient Yes Yes   Sig: Take 400 mg by mouth every 6 hours as needed for Mild Pain or Inflammation.      Facility-Administered Medications: None       Physical Exam  Temp:  [36.9 °C (98.5 °F)] 36.9 °C (98.5 °F)  Pulse:  [] 95  Resp:  [16-17] 17  BP: (152-164)/() 164/72  SpO2:  [93 %-96 %] 96 %  Blood Pressure: (!) 164/72   Temperature: 36.9 °C (98.5 °F)   Pulse: 95   Respiration: 17   Pulse Oximetry: 96 %       Physical Exam  Vitals and nursing note reviewed.   Constitutional:       General: He is not in acute distress.     Appearance: Normal appearance.   HENT:      Head: Normocephalic and atraumatic.      Jaw: Tenderness and swelling present.      Nose: Nose normal.      Mouth/Throat:      Mouth: Mucous membranes are moist.   Eyes:      Extraocular Movements: Extraocular movements intact.      Pupils: Pupils are equal, round, and reactive  "to light.   Cardiovascular:      Rate and Rhythm: Normal rate and regular rhythm.   Pulmonary:      Effort: Pulmonary effort is normal.      Breath sounds: Normal breath sounds.   Abdominal:      General: Abdomen is flat. There is no distension.      Tenderness: There is no abdominal tenderness.   Musculoskeletal:         General: No swelling or deformity. Normal range of motion.      Cervical back: Normal range of motion and neck supple.   Skin:     General: Skin is warm and dry.   Neurological:      General: No focal deficit present.      Mental Status: He is alert and oriented to person, place, and time.   Psychiatric:         Mood and Affect: Mood normal.         Behavior: Behavior normal.         Laboratory:  Recent Labs     01/23/24  2141   WBC 16.3*   RBC 5.14   HEMOGLOBIN 15.5   HEMATOCRIT 44.7   MCV 87.0   MCH 30.2   MCHC 34.7   RDW 38.8   PLATELETCT 229   MPV 11.4     Recent Labs     01/23/24  2141   SODIUM 134*   POTASSIUM 4.0   CHLORIDE 102   CO2 21   GLUCOSE 111*   BUN 11   CREATININE 0.88   CALCIUM 8.9     Recent Labs     01/23/24  2141   ALTSGPT 15   ASTSGOT 11*   ALKPHOSPHAT 85   TBILIRUBIN 0.4   GLUCOSE 111*         No results for input(s): \"NTPROBNP\" in the last 72 hours.      No results for input(s): \"TROPONINT\" in the last 72 hours.    Imaging:  [id number]   Final Result         1.  No acute traumatic bony injury identified.      DX-CHEST-PORTABLE (1 VIEW)   Final Result         1.  No acute cardiopulmonary disease.          X-Ray:  I have personally reviewed the images and compared with prior images.    Assessment/Plan:  Justification for Admission Status  I anticipate this patient will require at least two midnights for appropriate medical management, necessitating inpatient admission because dental infection with throat swelling    Patient will need a Intermediate Care (Adult and Pediatrics) bed on MEDICAL service .  The need is secondary to dental infection with throat " swelling.    * Dental infection with SIRS and airway edema- (present on admission)  Assessment & Plan  63-year-old male with history of morbid obesity, presented with throat swelling, dental pain, difficulties with salivation clearing secretions  ENT and dental surgery consulted.  Plan: Close observation in IMCU for possible development of airway compromise  Continue Unasyn and Decadron  N.p.o. for now IV fluid support  Pain control       Hyperglycemia  Assessment & Plan  Recommended weight loss    Morbid obesity (HCC)  Assessment & Plan  BMI 54        VTE prophylaxis: enoxaparin ppx

## 2024-01-24 NOTE — DIETARY
NUTRITION SERVICES: BMI - Pt with BMI >40 (=Body mass index is 57.04 kg/m².), Class III obesity. Weight loss counseling not appropriate in acute care setting. RECOMMEND - If appropriate at DC please refer to outpatient services for weight management.

## 2024-01-24 NOTE — PROGRESS NOTES
Called by Dr Gilliam for IMCU/ICU for phlegmon of airway. He is an obese male that tells me that he had flu like illness this pass weekend chills and fevers. 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 ER for ENT consultation. He describes he is feeling much better after the steroids. He is talking non stop with normal voice quality, no muffled breath or stridor no tracheal tug or neck deviation or pooling secretions. ENT and OMFS has been consulted with the above exam he can be admitted to IMCU for ongoing IVF, dexamethasone and monitoring. Please consult me for change in status or difficulty handling secretions, changes in voice, or respiratory distress. Recommend viral swab as well and antibiotics.     RTOC notified.     Markus Dean MD  Critical Care Medicine

## 2024-01-24 NOTE — PROGRESS NOTES
Hospital Medicine Daily Progress Note    Date of Service  1/24/2024    Chief Complaint  Ramakrishna Menezes Jr. is a 33 y.o. male admitted 1/23/2024 with oral pain    Hospital Course  33-year-old history BMI 57.  Originally seen at a freestanding [facility] emergency room where he had presented with sore throat and dental pain.  CT of the neck was done demonstrating possible peritonsillar abscess.  Sent here for ENT consultation    Interval Problem Update  Pt feeling better.  Notes he can open his mouth wider and with less pain.  Tolerating liquids without issue    [facility] ENT Dr Gorman at bedside    I have discussed this patient's plan of care and discharge plan at IDT rounds today with Case Management, Nursing, Nursing leadership, and other members of the IDT team.    Consultants/Specialty  ENT    Code Status  Full Code    Disposition  The patient is not medically cleared for discharge to home or a post-acute facility.      I have placed the appropriate orders for post-discharge needs.    Review of Systems  Review of Systems   Constitutional:  Negative for chills and fever.   HENT:  Positive for sore throat.    Respiratory:  Negative for cough and shortness of breath.    Cardiovascular:  Negative for chest pain.   Gastrointestinal:  Negative for abdominal pain, nausea and vomiting.        Physical Exam  Temp:  [36.7 °C (98 °F)-36.9 °C (98.5 °F)] 36.8 °C (98.2 °F)  Pulse:  [] 94  Resp:  [13-51] 22  BP: (140-186)/() 169/72  SpO2:  [89 %-96 %] 93 %    Physical Exam  Constitutional:       General: He is not in acute distress.     Appearance: He is well-developed. He is not diaphoretic.   HENT:      Head: Normocephalic and atraumatic.      Mouth/Throat:      Comments: Tonsilar edema/erythema however no exudates  No stridor  Pt handling secretions  Able to open to 2 knuckle widths  Tongue lies flush with lower incisors  Eyes:      Conjunctiva/sclera: Conjunctivae normal.   Neck:      Vascular: No JVD.    Cardiovascular:      Rate and Rhythm: Normal rate.      Heart sounds: No murmur heard.     No gallop.   Pulmonary:      Effort: Pulmonary effort is normal. No respiratory distress.      Breath sounds: No stridor. No wheezing or rales.   Abdominal:      Palpations: Abdomen is soft.      Tenderness: There is no abdominal tenderness. There is no guarding or rebound.   Skin:     General: Skin is warm and dry.      Findings: No rash.   Neurological:      Mental Status: He is oriented to person, place, and time.   Psychiatric:         Mood and Affect: Mood normal.         Behavior: Behavior normal.         Thought Content: Thought content normal.         Fluids    Intake/Output Summary (Last 24 hours) at 1/24/2024 0633  Last data filed at 1/24/2024 0600  Gross per 24 hour   Intake 734.16 ml   Output --   Net 734.16 ml       Laboratory  Recent Labs     01/23/24  2141 01/24/24  0335   WBC 16.3* 18.0*   RBC 5.14 4.86   HEMOGLOBIN 15.5 14.4   HEMATOCRIT 44.7 42.4   MCV 87.0 87.2   MCH 30.2 29.6   MCHC 34.7 34.0   RDW 38.8 38.5   PLATELETCT 229 208   MPV 11.4 11.4     Recent Labs     01/23/24  2141 01/24/24  0335   SODIUM 134* 134*   POTASSIUM 4.0 4.0   CHLORIDE 102 104   CO2 21 20   GLUCOSE 111* 129*   BUN 11 12   CREATININE 0.88 0.75   CALCIUM 8.9 8.3*                   Imaging  [id number]   Final Result      1.  No acute bony injury   2.  Incompletely erupted BILATERAL maxillary and mandibular molars      [id number]   Final Result         1.  No acute traumatic bony injury identified.      DX-CHEST-PORTABLE (1 VIEW)   Final Result         1.  No acute cardiopulmonary disease.           Assessment/Plan  * Dental infection with SIRS and airway edema- (present on admission)  Assessment & Plan  33-year-old male with history of morbid obesity, presented with throat swelling, dental pain, difficulties with salivation clearing secretions  CT is neg for fluid collection/abscess  Pt is handling secretions  No  stridor  Able to open 2 knuckle widths  Sufjectively feels like he's doing better  Cont 24hrs of IV Abx's and steroids  Probable DC in am if doing well    Hyperglycemia  Assessment & Plan  Recommended weight loss  A1c 5.9 2 ya; repeat ordered    Morbid obesity (HCC)  Assessment & Plan  BMI 54         VTE prophylaxis:    enoxaparin ppx      I have performed a physical exam and reviewed and updated ROS and Plan today (1/24/2024). In review of yesterday's note (1/23/2024), there are no changes except as documented above.

## 2024-01-24 NOTE — ED NOTES
Bedside report received from off going RN Josh, assumed care of patient.  POC discussed with patient. Call light within reach, all needs addressed at this time.       Fall risk interventions in place: Give patient urinal if applicable, Keep floor surfaces clean and dry, and Accompanied to restroom (all applicable per [place] Fall risk assessment)   Continuous monitoring: Cardiac Leads, Pulse Ox, or Blood Pressure  IVF/IV medications: Infusion per MAR (List Med(s)) unasyn, ns  Oxygen: Room Air  Bedside sitter: Not Applicable   Isolation: Not Applicable

## 2024-01-24 NOTE — PROGRESS NOTES
12 hour chart check complete.     Monitoring Summary:  Rhythm: NSR 60-90s bpm  Ectopy: none

## 2024-01-25 ENCOUNTER — PHARMACY VISIT (OUTPATIENT)
Dept: PHARMACY | Facility: MEDICAL CENTER | Age: 34
End: 2024-01-25
Payer: COMMERCIAL

## 2024-01-25 VITALS
SYSTOLIC BLOOD PRESSURE: 142 MMHG | HEIGHT: 66 IN | TEMPERATURE: 97.1 F | DIASTOLIC BLOOD PRESSURE: 78 MMHG | RESPIRATION RATE: 18 BRPM | OXYGEN SATURATION: 95 % | WEIGHT: 315 LBS | HEART RATE: 73 BPM | BODY MASS INDEX: 50.62 KG/M2

## 2024-01-25 PROBLEM — K04.7 DENTAL INFECTION: Status: RESOLVED | Noted: 2024-01-23 | Resolved: 2024-01-25

## 2024-01-25 PROCEDURE — 700102 HCHG RX REV CODE 250 W/ 637 OVERRIDE(OP): Performed by: INTERNAL MEDICINE

## 2024-01-25 PROCEDURE — A9270 NON-COVERED ITEM OR SERVICE: HCPCS | Performed by: INTERNAL MEDICINE

## 2024-01-25 PROCEDURE — 700105 HCHG RX REV CODE 258: Performed by: INTERNAL MEDICINE

## 2024-01-25 PROCEDURE — 700111 HCHG RX REV CODE 636 W/ 250 OVERRIDE (IP): Mod: JZ | Performed by: INTERNAL MEDICINE

## 2024-01-25 PROCEDURE — 99239 HOSP IP/OBS DSCHRG MGMT >30: CPT | Performed by: HOSPITALIST

## 2024-01-25 PROCEDURE — RXMED WILLOW AMBULATORY MEDICATION CHARGE: Performed by: HOSPITALIST

## 2024-01-25 RX ORDER — AMOXICILLIN AND CLAVULANATE POTASSIUM 875; 125 MG/1; MG/1
1 TABLET, FILM COATED ORAL 2 TIMES DAILY
Qty: 10 TABLET | Refills: 0 | Status: ACTIVE | OUTPATIENT
Start: 2024-01-25 | End: 2024-02-08

## 2024-01-25 RX ADMIN — DEXAMETHASONE SODIUM PHOSPHATE 4 MG: 4 INJECTION INTRA-ARTICULAR; INTRALESIONAL; INTRAMUSCULAR; INTRAVENOUS; SOFT TISSUE at 05:54

## 2024-01-25 RX ADMIN — ENOXAPARIN SODIUM 60 MG: 100 INJECTION SUBCUTANEOUS at 05:54

## 2024-01-25 RX ADMIN — AMPICILLIN SODIUM, SULBACTAM SODIUM 3 G: 2; 1 INJECTION, POWDER, FOR SOLUTION INTRAMUSCULAR; INTRAVENOUS at 04:16

## 2024-01-25 RX ADMIN — DOCUSATE SODIUM 50 MG AND SENNOSIDES 8.6 MG 2 TABLET: 8.6; 5 TABLET, FILM COATED ORAL at 06:00

## 2024-01-25 ASSESSMENT — PAIN DESCRIPTION - PAIN TYPE
TYPE: ACUTE PAIN

## 2024-01-25 NOTE — PROGRESS NOTES
0715: Received report, assumed pt care. Pt a&o x 4, VSS, Assessment completed. Resting comfortably in bed with call light, bedside table in reach. No c/o at this time. Side rails up x2. Instructed to use call light when needing assistance, verbalized understanding. Bed alarm off, pt up self and steady,  bed in low position. Dr. Sandoval at bedside. Will continue to monitor.

## 2024-01-25 NOTE — PROGRESS NOTES
SBAR given to ELDA Burroughs on [facility] medical floor.  Transport present at bedside and took Pt to S519 via wheelchair.  Pt took all of this belongings with him and left at baseline A and O x4 on RA.

## 2024-01-25 NOTE — PROGRESS NOTES
PIV D/C'd.  Discharge instructions provided to pt.  Pt states understanding.  Pt states all questions have been answered.  Copy of discharge provided to pt.  Signed copy in chart.  Prescriptions provided to pt, copy in chart. Pt states that all personal belongings are in possession.  Meds to beds with patient. Pt walked off unit independently without incident.

## 2024-01-25 NOTE — CARE PLAN
The patient is Stable - Low risk of patient condition declining or worsening    Shift Goals  Clinical Goals: Comfortable in bed and attended needs  Patient Goals: home soon and snack  Family Goals: Updates PRN.    Progress made toward(s) clinical / shift goals:  Have a restful night.

## 2024-01-25 NOTE — PROGRESS NOTES
"Transferred IMCU per wheelchair. Received alert and oriented x 4. Check vitals sign and recorded accordingly and due med given per MAR. Monitor sign and symptoms of respiratory distress and treatment given accordingly per MAR.Medicated per MAR and reassessed every 2 hours per protocol. Call light within reach. Steady on his feet. Denies pain/discomfort. Needs attended. Continue to monitor./82   Pulse 73   Temp 36 °C (96.8 °F) (Temporal)   Resp 18   Ht 1.676 m (5' 6\")   Wt (!) 160 kg (353 lb 6.4 oz)   SpO2 93%   BMI 57.04 kg/m² .Refused skin check.  "

## 2024-01-26 LAB
BACTERIA UR CULT: NORMAL
SIGNIFICANT IND 70042: NORMAL
SITE SITE: NORMAL
SOURCE SOURCE: NORMAL

## 2024-01-26 NOTE — DISCHARGE SUMMARY
Discharge Summary    CHIEF COMPLAINT ON ADMISSION  Chief Complaint   Patient presents with    Abscess     BIB [facility] from [facility] Standing ER for peritonsillar abscess.Unable to control secretions.        Reason for Admission  EMS     Admission Date  1/23/2024    CODE STATUS  Prior    HPI & HOSPITAL COURSE  This is a 33 y.o. male here with history of BMI 57, impaired glucose tolerance.  Patient presented to an outlying facility with difficulty breathing, and was found to have a tonsillar infection, and was sent to us due to concern for his airway, and peritonsillar abscess.    Here in the patient was evaluated by ENT, and had a CT done.  This demonstrated tonsillar edema, but no focal fluid collection.  He was treated with IV ampicillin sulbactam, and steroids to which she responded well.  The morning following admission, he was breathing much more easily, and handling his secretions and tolerating p.o.  He was observed for further 24 hours.  On the day of discharge he is doing better and tolerating normal diet.  He is on room air and able to ambulate the unit without desaturation.    As regards other issues, he does have some hyperglycemia in the low 100-1 20 range.  An A1c was done which was 5.9.  I reviewed with him that he is borderline diabetic, something with which he is aware.  He has been working on weight loss, and has lost significant mount of weight in the last few months.  We will discharge him on amoxicillin, but will stop steroids.  He was also seen by Dr. Garcia of dental surgery, and will follow with him as regards his impacted wisdom teeth.  No notes on file    Therefore, he is discharged in good and stable condition to home with close outpatient follow-up.    The patient met 2-midnight criteria for an inpatient stay at the time of discharge.    Discharge Date  1/25/2024    FOLLOW UP ITEMS POST DISCHARGE  With dental surgery as noted    DISCHARGE DIAGNOSES  Principal  Problem (Resolved):    Dental infection with SIRS and airway edema (POA: Yes)  Active Problems:    Morbid obesity (HCC) (POA: Unknown)    Hyperglycemia (POA: Unknown)      FOLLOW UP  Future Appointments   Date Time Provider Department Center   2/8/2024 10:00 AM DARON Dowell [facility]     Primary Care Provider    Schedule an appointment as soon as possible for a visit in 1 week(s)      Shai Garcia D.D.S.  [address] [address]  [phone]    Schedule an appointment as soon as possible for a visit in 3 day(s)      Pcp Pt States None            MEDICATIONS ON DISCHARGE     Medication List        START taking these medications        Instructions   amoxicillin-clavulanate 875-125 MG Tabs  Commonly known as: Augmentin   Take 1 Tablet by mouth 2 times a day.  Dose: 1 Tablet            CONTINUE taking these medications        Instructions   acetaminophen 500 MG Tabs  Commonly known as: Tylenol   Take 500 mg by mouth every 6 hours as needed for Mild Pain or Moderate Pain.  Dose: 500 mg     ibuprofen 200 MG Tabs  Commonly known as: Motrin   Take 400 mg by mouth every 6 hours as needed for Mild Pain or Inflammation.  Dose: 400 mg              Allergies  No Known Allergies    DIET  No orders of the defined types were placed in this encounter.      ACTIVITY  As tolerated.  Weight bearing as tolerated    CONSULTATIONS  ENT  Dental surgery    PROCEDURES  None    LABORATORY  Lab Results   Component Value Date    SODIUM 134 (L) 01/24/2024    POTASSIUM 4.0 01/24/2024    CHLORIDE 104 01/24/2024    CO2 20 01/24/2024    GLUCOSE 129 (H) 01/24/2024    BUN 12 01/24/2024    CREATININE 0.75 01/24/2024        Lab Results   Component Value Date    WBC 18.0 (H) 01/24/2024    HEMOGLOBIN 14.4 01/24/2024    HEMATOCRIT 42.4 01/24/2024    PLATELETCT 208 01/24/2024        Total time of the discharge process exceeds 35 minutes.  I spent most of that time with the patient reviewing results of studies, discharge  medications, and follow-up issues.  On the day of discharge he is feeling much better and looking forward to going home.

## 2024-01-28 LAB
BACTERIA BLD CULT: NORMAL
BACTERIA BLD CULT: NORMAL
SIGNIFICANT IND 70042: NORMAL
SIGNIFICANT IND 70042: NORMAL
SITE SITE: NORMAL
SITE SITE: NORMAL
SOURCE SOURCE: NORMAL
SOURCE SOURCE: NORMAL

## 2024-02-08 ENCOUNTER — OFFICE VISIT (OUTPATIENT)
Dept: MEDICAL GROUP | Facility: MEDICAL CENTER | Age: 34
End: 2024-02-08
Payer: COMMERCIAL

## 2024-02-08 VITALS
SYSTOLIC BLOOD PRESSURE: 140 MMHG | HEART RATE: 100 BPM | WEIGHT: 315 LBS | HEIGHT: 69 IN | RESPIRATION RATE: 16 BRPM | OXYGEN SATURATION: 95 % | BODY MASS INDEX: 46.65 KG/M2 | DIASTOLIC BLOOD PRESSURE: 60 MMHG | TEMPERATURE: 98 F

## 2024-02-08 DIAGNOSIS — R06.83 SNORING: ICD-10-CM

## 2024-02-08 DIAGNOSIS — E66.01 MORBID OBESITY (HCC): ICD-10-CM

## 2024-02-08 DIAGNOSIS — Z11.59 NEED FOR HEPATITIS B SCREENING TEST: ICD-10-CM

## 2024-02-08 DIAGNOSIS — J36 PERITONSILLAR ABSCESS: ICD-10-CM

## 2024-02-08 DIAGNOSIS — R53.83 FATIGUE, UNSPECIFIED TYPE: ICD-10-CM

## 2024-02-08 DIAGNOSIS — R73.01 IFG (IMPAIRED FASTING GLUCOSE): ICD-10-CM

## 2024-02-08 DIAGNOSIS — M72.2 PLANTAR FASCIITIS, BILATERAL: ICD-10-CM

## 2024-02-08 DIAGNOSIS — I10 PRIMARY HYPERTENSION: ICD-10-CM

## 2024-02-08 DIAGNOSIS — E55.9 VITAMIN D DEFICIENCY: ICD-10-CM

## 2024-02-08 PROCEDURE — 99204 OFFICE O/P NEW MOD 45 MIN: CPT | Performed by: NURSE PRACTITIONER

## 2024-02-08 PROCEDURE — 3078F DIAST BP <80 MM HG: CPT | Performed by: NURSE PRACTITIONER

## 2024-02-08 PROCEDURE — 3077F SYST BP >= 140 MM HG: CPT | Performed by: NURSE PRACTITIONER

## 2024-02-08 RX ORDER — MULTIVIT-MIN/IRON/FOLIC ACID/K 18-600-40
2000 CAPSULE ORAL DAILY
Qty: 30 CAPSULE | Refills: 0
Start: 2024-02-08

## 2024-02-08 RX ORDER — VITAMIN B COMPLEX
1 CAPSULE ORAL DAILY
Qty: 30 CAPSULE | Refills: 0
Start: 2024-02-08

## 2024-02-08 RX ORDER — M-VIT,TX,IRON,MINS/CALC/FOLIC 27MG-0.4MG
1 TABLET ORAL DAILY
Qty: 30 TABLET | Refills: 0
Start: 2024-02-08

## 2024-02-08 SDOH — HEALTH STABILITY: PHYSICAL HEALTH: ON AVERAGE, HOW MANY MINUTES DO YOU ENGAGE IN EXERCISE AT THIS LEVEL?: 10 MIN

## 2024-02-08 SDOH — HEALTH STABILITY: PHYSICAL HEALTH: ON AVERAGE, HOW MANY DAYS PER WEEK DO YOU ENGAGE IN MODERATE TO STRENUOUS EXERCISE (LIKE A BRISK WALK)?: 1 DAY

## 2024-02-08 SDOH — ECONOMIC STABILITY: INCOME INSECURITY: IN THE LAST 12 MONTHS, WAS THERE A TIME WHEN YOU WERE NOT ABLE TO PAY THE MORTGAGE OR RENT ON TIME?: NO

## 2024-02-08 SDOH — ECONOMIC STABILITY: HOUSING INSECURITY
IN THE LAST 12 MONTHS, WAS THERE A TIME WHEN YOU DID NOT HAVE A STEADY PLACE TO SLEEP OR SLEPT IN A SHELTER (INCLUDING NOW)?: NO

## 2024-02-08 SDOH — ECONOMIC STABILITY: HOUSING INSECURITY

## 2024-02-08 SDOH — HEALTH STABILITY: MENTAL HEALTH
STRESS IS WHEN SOMEONE FEELS TENSE, NERVOUS, ANXIOUS, OR CAN'T SLEEP AT NIGHT BECAUSE THEIR MIND IS TROUBLED. HOW STRESSED ARE YOU?: NOT AT ALL

## 2024-02-08 SDOH — ECONOMIC STABILITY: INCOME INSECURITY: HOW HARD IS IT FOR YOU TO PAY FOR THE VERY BASICS LIKE FOOD, HOUSING, MEDICAL CARE, AND HEATING?: NOT HARD AT ALL

## 2024-02-08 SDOH — ECONOMIC STABILITY: TRANSPORTATION INSECURITY
IN THE PAST 12 MONTHS, HAS LACK OF TRANSPORTATION KEPT YOU FROM MEETINGS, WORK, OR FROM GETTING THINGS NEEDED FOR DAILY LIVING?: NO

## 2024-02-08 SDOH — ECONOMIC STABILITY: TRANSPORTATION INSECURITY
IN THE PAST 12 MONTHS, HAS LACK OF RELIABLE TRANSPORTATION KEPT YOU FROM MEDICAL APPOINTMENTS, MEETINGS, WORK OR FROM GETTING THINGS NEEDED FOR DAILY LIVING?: NO

## 2024-02-08 SDOH — ECONOMIC STABILITY: FOOD INSECURITY: WITHIN THE PAST 12 MONTHS, YOU WORRIED THAT YOUR FOOD WOULD RUN OUT BEFORE YOU GOT MONEY TO BUY MORE.: NEVER TRUE

## 2024-02-08 SDOH — ECONOMIC STABILITY: FOOD INSECURITY: WITHIN THE PAST 12 MONTHS, THE FOOD YOU BOUGHT JUST DIDN'T LAST AND YOU DIDN'T HAVE MONEY TO GET MORE.: NEVER TRUE

## 2024-02-08 SDOH — ECONOMIC STABILITY: TRANSPORTATION INSECURITY
IN THE PAST 12 MONTHS, HAS THE LACK OF TRANSPORTATION KEPT YOU FROM MEDICAL APPOINTMENTS OR FROM GETTING MEDICATIONS?: NO

## 2024-02-08 ASSESSMENT — SOCIAL DETERMINANTS OF HEALTH (SDOH)
HOW OFTEN DO YOU ATTEND CHURCH OR RELIGIOUS SERVICES?: NEVER
HOW OFTEN DO YOU GET TOGETHER WITH FRIENDS OR RELATIVES?: NEVER
IN A TYPICAL WEEK, HOW MANY TIMES DO YOU TALK ON THE PHONE WITH FAMILY, FRIENDS, OR NEIGHBORS?: MORE THAN THREE TIMES A WEEK
HOW OFTEN DO YOU ATTENT MEETINGS OF THE CLUB OR ORGANIZATION YOU BELONG TO?: NEVER
IN A TYPICAL WEEK, HOW MANY TIMES DO YOU TALK ON THE PHONE WITH FAMILY, FRIENDS, OR NEIGHBORS?: MORE THAN THREE TIMES A WEEK
HOW HARD IS IT FOR YOU TO PAY FOR THE VERY BASICS LIKE FOOD, HOUSING, MEDICAL CARE, AND HEATING?: NOT HARD AT ALL
DO YOU BELONG TO ANY CLUBS OR ORGANIZATIONS SUCH AS CHURCH GROUPS UNIONS, FRATERNAL OR ATHLETIC GROUPS, OR SCHOOL GROUPS?: NO
HOW OFTEN DO YOU HAVE A DRINK CONTAINING ALCOHOL: NEVER
WITHIN THE PAST 12 MONTHS, YOU WORRIED THAT YOUR FOOD WOULD RUN OUT BEFORE YOU GOT THE MONEY TO BUY MORE: NEVER TRUE
HOW OFTEN DO YOU ATTENT MEETINGS OF THE CLUB OR ORGANIZATION YOU BELONG TO?: NEVER
HOW OFTEN DO YOU GET TOGETHER WITH FRIENDS OR RELATIVES?: NEVER
HOW MANY DRINKS CONTAINING ALCOHOL DO YOU HAVE ON A TYPICAL DAY WHEN YOU ARE DRINKING: PATIENT DOES NOT DRINK
HOW OFTEN DO YOU ATTEND CHURCH OR RELIGIOUS SERVICES?: NEVER
DO YOU BELONG TO ANY CLUBS OR ORGANIZATIONS SUCH AS CHURCH GROUPS UNIONS, FRATERNAL OR ATHLETIC GROUPS, OR SCHOOL GROUPS?: NO
HOW OFTEN DO YOU HAVE SIX OR MORE DRINKS ON ONE OCCASION: NEVER

## 2024-02-08 ASSESSMENT — LIFESTYLE VARIABLES
HOW OFTEN DO YOU HAVE A DRINK CONTAINING ALCOHOL: NEVER
SKIP TO QUESTIONS 9-10: 1
HOW MANY STANDARD DRINKS CONTAINING ALCOHOL DO YOU HAVE ON A TYPICAL DAY: PATIENT DOES NOT DRINK
HOW OFTEN DO YOU HAVE SIX OR MORE DRINKS ON ONE OCCASION: NEVER
AUDIT-C TOTAL SCORE: 0

## 2024-02-08 ASSESSMENT — FIBROSIS 4 INDEX: FIB4 SCORE: 0.38

## 2024-02-08 NOTE — ASSESSMENT & PLAN NOTE
Sx resolved.  He has had tonsils swollen before.  He uses tea that resolved in the past.  This time the tea did not help.  This time the tonsil swelling was d/t a dental infection with his wisdom teeth.  He will set up with a dentist to get this taken care of.

## 2024-02-08 NOTE — PROGRESS NOTES
Subjective     Ramakrishna Menezes Jr. is a 33 y.o. male who presents with [facility] Follow-up            HPI  Seen to establish care.  He has hx of snoring. In [place] he was supposed to have sleep study but didnt go to appt.  He wakes up feeling fatigued all the time.    Primary hypertension  He has a long hx of elevated BP but declines bp med. Reviewed patho of HTN WITH CAD, CVA, PAD.    IFG (impaired fasting glucose)  He is not on healthy diet.  He is only eating 1x/day.  He is working [employer] jobs and going to college.  He is just off work at one job today and headed to another job.  Will sleep tonite. A1c in hosp was 5.9.  he had this before moving here last year at [place].  They had also told him he was pre-DM.    Peritonsillar abscess  Sx resolved.  He has had tonsils swollen before.  He uses tea that resolved in the past.  This time the tea did not help.  This time the tonsil swelling was d/t a dental infection with his wisdom teeth.  He will set up with a dentist to get this taken care of.     Plantar fasciitis, bilateral  He wears orthotics in his shoes.  He was a  for 10 yrs.      Morbid obesity (HCC)  He was going to have bariatric surgery in [place] BUT HE moved here.  He is now at the weight to get surgery.  Currently he doesn't have plans to do surgery.  In [place] his insurance would have paid for surgery.  He doesn't know if his current insurance will cover.  He is usually eats healthy diet except when he gets down.     Snoring  Was sched for sleep eval in [place] but didn't go. Declines referral today    Vitamin D deficiency  Has hx of low vitamin d.  Currently on otc supplement     Patient Active Problem List    Diagnosis Date Noted    IFG (impaired fasting glucose) 02/08/2024    Peritonsillar abscess 02/08/2024    Primary hypertension 02/08/2024    Plantar fasciitis, bilateral 02/08/2024    Snoring 02/08/2024    Vitamin D deficiency 02/08/2024    Morbid obesity (HCC) 01/23/2024      Current Outpatient Medications   Medication Sig Dispense Refill    therapeutic multivitamin-minerals (THERAGRAN-M) Tab Take 1 Tablet by mouth every day. 30 Tablet 0    Cholecalciferol (VITAMIN D) 50 MCG (2000 UT) Cap Take 1 Capsule by mouth every day. 30 Capsule 0    b complex vitamins capsule Take 1 Capsule by mouth every day. 30 Capsule 0    Ashwagandha 125 MG Cap Take 1 Capsule by mouth every day. 30 Capsule 0     No current facility-administered medications for this visit.     Patient has no known allergies.  Past Medical History:   Diagnosis Date    IFG (impaired fasting glucose) 02/08/2024    Morbid obesity (HCC) 01/23/2024    Peritonsillar abscess 02/08/2024    Plantar fasciitis, bilateral 02/08/2024    Primary hypertension 02/08/2024    Snoring 02/08/2024    Vitamin D deficiency 02/08/2024       History reviewed. No pertinent surgical history.    ROS  Review of Systems   Constitutional: Negative.  Negative for fever, chills, weight loss, malaise/fatigue and diaphoresis.   HENT: Negative.  Negative for hearing loss, ear pain, nosebleeds, congestion, sore throat, neck pain, tinnitus and ear discharge.    Eyes: Negative.  Negative for blurred vision, double vision, photophobia, pain, discharge and redness.   Respiratory: Negative.  Negative for cough, hemoptysis, sputum production, shortness of breath, wheezing and stridor.    Cardiovascular: Negative.  Negative for chest pain, palpitations, orthopnea, claudication, leg swelling and PND.   Gastrointestinal: Negative.  Negative for heartburn, nausea, vomiting, abdominal pain, diarrhea, constipation, blood in stool and melena.   Genitourinary: Negative.  Negative for dysuria, urgency, frequency, incontinence, hematuria and flank pain.   Musculoskeletal: Negative.  Negative for myalgias, back pain, joint pain and falls.   Skin: Negative.  Negative for itching and rash.   Neurological: Negative.  Negative for dizziness, tingling, tremors, sensory change, speech  "change, focal weakness, seizures, loss of consciousness, weakness and headaches.   Endo/Heme/Allergies: Negative.  Negative for environmental allergies and polydipsia. Does not bruise/bleed easily.   Psychiatric/Behavioral: Negative.  Negative for depression, suicidal ideas, hallucinations, memory loss and substance abuse. The patient is not nervous/anxious and does not have insomnia.    All other systems reviewed and are negative.      Objective     BP (!) 140/60 (BP Location: Left arm, Patient Position: Sitting, BP Cuff Size: Adult)   Pulse 100   Temp 36.7 °C (98 °F) (Temporal)   Resp 16   Ht 1.75 m (5' 8.9\")   Wt (!) 161 kg (354 lb)   SpO2 95%   BMI 52.43 kg/m²      Physical Exam  Physical Exam   Vitals reviewed.  Constitutional: oriented to person, place, and time. appears well-developed and well-nourished. No distress.   HENT: Head: Normocephalic and atraumatic. Bilateral tympanic membranes wnl w/o bulging.  Right Ear: External ear normal. Left Ear: External ear normal. Nose: Nose normal.  Mouth/Throat: Oropharynx is clear and moist. No oropharyngeal exudate. mary ellen tm wnl. Eyes: Conjunctivae and EOM are normal. Pupils are equal, round, and reactive to light. Right eye exhibits no discharge. Left eye exhibits no discharge. No scleral icterus.    Neck: Normal range of motion. Neck supple. No JVD present.   Cardiovascular: Normal rate, regular rhythm, normal heart sounds and intact distal pulses.  Exam reveals no gallop and no friction rub.  No murmur heard.  No carotid bruits   Pulmonary/Chest: Effort normal and breath sounds normal. No stridor. No respiratory distress. no wheezes or rales. exhibits no tenderness.   Abdominal: Soft. Bowel sounds are normal. exhibits no distension and no mass. No tenderness. no rebound and no guarding.   Musculoskeletal: Normal range of motion. exhibits no edema or tenderness.  mary ellen pedal pulses 2+.  Lymphadenopathy:  no cervical or supraclavicular adenopathy.   Neurological: " alert and oriented to person, place, and time. has normal reflexes. displays normal reflexes. No cranial nerve deficit. exhibits normal muscle tone. Coordination normal.   Skin: Skin is warm and dry. No rash noted. no diaphoresis. No erythema. No pallor.   Psychiatric: normal mood and affect. behavior is normal.     Assessment & Plan     1. Primary hypertension  Lipid Profile    MICROALBUMIN CREAT RATIO URINE    BP uncontrolled but pt refuses med tx despite education on end organ complications of HTN.  do lab, f/u for review      2. IFG (impaired fasting glucose)  Lipid Profile    MICROALBUMIN CREAT RATIO URINE    enc pt to improve healthy diet & regular exercise. ck lab, f/u 6 mo for lab review.      3. Peritonsillar abscess      sx resolving but still lg tonsils, ynes left. declines ENT referral. will f/u w/his dentist for tx since had wisdom tooth infection causing abscess      4. Snoring      was sched for sleep study d/t sx of sleep apnea in [place] but he did not do. declines referral today      5. Plantar fasciitis, bilateral      wears orthotics for sx control. consider podiatry referral if still having pain at f/u      6. Vitamin D deficiency  VITAMIN D,25 HYDROXY (DEFICIENCY)    taking otc supplement      7. Morbid obesity (HCC)      was planning on bariatic surgery til he moved from [place]. enc restarting healthy diet and exercise.      8. Fatigue, unspecified type  Testosterone, Free & Total, Adult Male (w/SHBG)    he would like testosterone ck'd d/t fatigue but works 3 jobs, going to school, has 3 children. do lab, f/u for review 1 mo      9. Need for hepatitis B screening test  HEP B SURFACE AB    may have had hep b vaccines. will do AB titer with upcoming lab.

## 2024-02-08 NOTE — ASSESSMENT & PLAN NOTE
He was going to have bariatric surgery in [place] BUT HE moved here.  He is now at the weight to get surgery.  Currently he doesn't have plans to do surgery.  In [place] his insurance would have paid for surgery.  He doesn't know if his current insurance will cover.  He is usually eats healthy diet except when he gets down.

## 2024-02-08 NOTE — ASSESSMENT & PLAN NOTE
He is not on healthy diet.  He is only eating 1x/day.  He is working shopp jobs and going to college.  He is just off work at one job today and headed to another job.  Will sleep tonite. A1c in hosp was 5.9.  he had this before moving here last year at [place].  They had also told him he was pre-DM.

## 2024-02-12 ENCOUNTER — OFFICE VISIT (OUTPATIENT)
Dept: URGENT CARE | Facility: CLINIC | Age: 34
End: 2024-02-12
Payer: COMMERCIAL

## 2024-02-12 ENCOUNTER — APPOINTMENT (OUTPATIENT)
Dept: RADIOLOGY | Facility: MEDICAL CENTER | Age: 34
End: 2024-02-12
Attending: EMERGENCY MEDICINE
Payer: COMMERCIAL

## 2024-02-12 ENCOUNTER — HOSPITAL ENCOUNTER (EMERGENCY)
Facility: MEDICAL CENTER | Age: 34
End: 2024-02-12
Attending: EMERGENCY MEDICINE
Payer: COMMERCIAL

## 2024-02-12 VITALS
HEIGHT: 66 IN | RESPIRATION RATE: 20 BRPM | SYSTOLIC BLOOD PRESSURE: 120 MMHG | DIASTOLIC BLOOD PRESSURE: 82 MMHG | HEART RATE: 86 BPM | WEIGHT: 315 LBS | OXYGEN SATURATION: 95 % | TEMPERATURE: 97.8 F | BODY MASS INDEX: 50.62 KG/M2

## 2024-02-12 VITALS
TEMPERATURE: 97 F | HEIGHT: 66 IN | BODY MASS INDEX: 50.62 KG/M2 | DIASTOLIC BLOOD PRESSURE: 80 MMHG | HEART RATE: 101 BPM | SYSTOLIC BLOOD PRESSURE: 162 MMHG | RESPIRATION RATE: 18 BRPM | WEIGHT: 315 LBS | OXYGEN SATURATION: 96 %

## 2024-02-12 DIAGNOSIS — R00.2 PALPITATIONS: ICD-10-CM

## 2024-02-12 DIAGNOSIS — R07.9 CHEST PAIN, UNSPECIFIED TYPE: ICD-10-CM

## 2024-02-12 DIAGNOSIS — R30.0 DYSURIA: ICD-10-CM

## 2024-02-12 DIAGNOSIS — R03.0 ELEVATED BLOOD PRESSURE READING: ICD-10-CM

## 2024-02-12 DIAGNOSIS — I10 HYPERTENSION, UNSPECIFIED TYPE: ICD-10-CM

## 2024-02-12 LAB
ALBUMIN SERPL BCP-MCNC: 4.1 G/DL (ref 3.2–4.9)
ALBUMIN/GLOB SERPL: 1.2 G/DL
ALP SERPL-CCNC: 68 U/L (ref 30–99)
ALT SERPL-CCNC: 20 U/L (ref 2–50)
ANION GAP SERPL CALC-SCNC: 11 MMOL/L (ref 7–16)
APPEARANCE UR: CLEAR
AST SERPL-CCNC: 16 U/L (ref 12–45)
BASOPHILS # BLD AUTO: 0.2 % (ref 0–1.8)
BASOPHILS # BLD: 0.02 K/UL (ref 0–0.12)
BILIRUB SERPL-MCNC: 0.3 MG/DL (ref 0.1–1.5)
BILIRUB UR STRIP-MCNC: NEGATIVE MG/DL
BUN SERPL-MCNC: 12 MG/DL (ref 8–22)
CALCIUM ALBUM COR SERPL-MCNC: 9.4 MG/DL (ref 8.5–10.5)
CALCIUM SERPL-MCNC: 9.5 MG/DL (ref 8.5–10.5)
CHLORIDE SERPL-SCNC: 105 MMOL/L (ref 96–112)
CO2 SERPL-SCNC: 22 MMOL/L (ref 20–33)
COLOR UR AUTO: NORMAL
CREAT SERPL-MCNC: 1.15 MG/DL (ref 0.5–1.4)
EOSINOPHIL # BLD AUTO: 0.2 K/UL (ref 0–0.51)
EOSINOPHIL NFR BLD: 2.4 % (ref 0–6.9)
ERYTHROCYTE [DISTWIDTH] IN BLOOD BY AUTOMATED COUNT: 40.2 FL (ref 35.9–50)
GFR SERPLBLD CREATININE-BSD FMLA CKD-EPI: 85 ML/MIN/1.73 M 2
GLOBULIN SER CALC-MCNC: 3.4 G/DL (ref 1.9–3.5)
GLUCOSE SERPL-MCNC: 142 MG/DL (ref 65–99)
GLUCOSE UR STRIP.AUTO-MCNC: NEGATIVE MG/DL
HCT VFR BLD AUTO: 46 % (ref 42–52)
HGB BLD-MCNC: 15.3 G/DL (ref 14–18)
IMM GRANULOCYTES # BLD AUTO: 0.01 K/UL (ref 0–0.11)
IMM GRANULOCYTES NFR BLD AUTO: 0.1 % (ref 0–0.9)
KETONES UR STRIP.AUTO-MCNC: NEGATIVE MG/DL
LEUKOCYTE ESTERASE UR QL STRIP.AUTO: NORMAL
LYMPHOCYTES # BLD AUTO: 2.38 K/UL (ref 1–4.8)
LYMPHOCYTES NFR BLD: 29 % (ref 22–41)
MCH RBC QN AUTO: 29.5 PG (ref 27–33)
MCHC RBC AUTO-ENTMCNC: 33.3 G/DL (ref 32.3–36.5)
MCV RBC AUTO: 88.6 FL (ref 81.4–97.8)
MONOCYTES # BLD AUTO: 0.43 K/UL (ref 0–0.85)
MONOCYTES NFR BLD AUTO: 5.2 % (ref 0–13.4)
NEUTROPHILS # BLD AUTO: 5.17 K/UL (ref 1.82–7.42)
NEUTROPHILS NFR BLD: 63.1 % (ref 44–72)
NITRITE UR QL STRIP.AUTO: NEGATIVE
NRBC # BLD AUTO: 0 K/UL
NRBC BLD-RTO: 0 /100 WBC (ref 0–0.2)
PH UR STRIP.AUTO: 6.5 [PH] (ref 5–8)
PLATELET # BLD AUTO: 292 K/UL (ref 164–446)
PMV BLD AUTO: 11.2 FL (ref 9–12.9)
POTASSIUM SERPL-SCNC: 3.7 MMOL/L (ref 3.6–5.5)
PROT SERPL-MCNC: 7.5 G/DL (ref 6–8.2)
PROT UR QL STRIP: NEGATIVE MG/DL
RBC # BLD AUTO: 5.19 M/UL (ref 4.7–6.1)
RBC UR QL AUTO: NORMAL
SODIUM SERPL-SCNC: 138 MMOL/L (ref 135–145)
SP GR UR STRIP.AUTO: 1.02
TROPONIN T SERPL-MCNC: 10 NG/L (ref 6–19)
TROPONIN T SERPL-MCNC: 9 NG/L (ref 6–19)
UROBILINOGEN UR STRIP-MCNC: 0.2 MG/DL
WBC # BLD AUTO: 8.2 K/UL (ref 4.8–10.8)

## 2024-02-12 PROCEDURE — 84484 ASSAY OF TROPONIN QUANT: CPT

## 2024-02-12 PROCEDURE — 85025 COMPLETE CBC W/AUTO DIFF WBC: CPT

## 2024-02-12 PROCEDURE — 3077F SYST BP >= 140 MM HG: CPT | Performed by: FAMILY MEDICINE

## 2024-02-12 PROCEDURE — 71045 X-RAY EXAM CHEST 1 VIEW: CPT

## 2024-02-12 PROCEDURE — 99214 OFFICE O/P EST MOD 30 MIN: CPT | Performed by: FAMILY MEDICINE

## 2024-02-12 PROCEDURE — 80053 COMPREHEN METABOLIC PANEL: CPT

## 2024-02-12 PROCEDURE — 3079F DIAST BP 80-89 MM HG: CPT | Performed by: FAMILY MEDICINE

## 2024-02-12 PROCEDURE — 81002 URINALYSIS NONAUTO W/O SCOPE: CPT | Performed by: FAMILY MEDICINE

## 2024-02-12 PROCEDURE — 99283 EMERGENCY DEPT VISIT LOW MDM: CPT

## 2024-02-12 PROCEDURE — 93000 ELECTROCARDIOGRAM COMPLETE: CPT | Performed by: FAMILY MEDICINE

## 2024-02-12 PROCEDURE — 36415 COLL VENOUS BLD VENIPUNCTURE: CPT

## 2024-02-12 RX ORDER — LISINOPRIL 5 MG/1
5 TABLET ORAL DAILY
Qty: 30 TABLET | Refills: 0 | Status: SHIPPED | OUTPATIENT
Start: 2024-02-12 | End: 2024-03-27 | Stop reason: SDUPTHER

## 2024-02-12 RX ORDER — ASPIRIN 81 MG/1
324 TABLET, CHEWABLE ORAL ONCE
Status: COMPLETED | OUTPATIENT
Start: 2024-02-12 | End: 2024-02-12

## 2024-02-12 RX ADMIN — ASPIRIN 324 MG: 81 TABLET, CHEWABLE ORAL at 16:01

## 2024-02-12 ASSESSMENT — ENCOUNTER SYMPTOMS
MYALGIAS: 0
HEADACHES: 1
PALPITATIONS: 1
FEVER: 0
DIZZINESS: 0
SORE THROAT: 0
CHILLS: 0
VOMITING: 0
NAUSEA: 1
SHORTNESS OF BREATH: 0

## 2024-02-12 ASSESSMENT — HEART SCORE
HEART SCORE: 1
TROPONIN: LESS THAN OR EQUAL TO NORMAL LIMIT
RISK FACTORS: 1-2 RISK FACTORS
AGE: <45
HISTORY: SLIGHTLY SUSPICIOUS
ECG: NORMAL

## 2024-02-12 ASSESSMENT — FIBROSIS 4 INDEX
FIB4 SCORE: 0.38
FIB4 SCORE: 0.38

## 2024-02-12 NOTE — PROGRESS NOTES
Subjective:   Ramakrishna Menezes Jr. is a 33 y.o. male who presents for Chest Pain (Started last night), Nausea, Headache, Chest Pressure (Pt has high blood pressure ), and UTI (X3)        33-year-old male with a history of hypertension presents with chief complaint of left sternal chest pain, sudden onset last night while at work which was described as pressure with no radiation and associated with nausea.  Patient also related onset of palpitations with the chest pain.  Reports pain persisted throughout the night and reports persistent intermittent palpitations this morning.  Denies limiting chest pain at this time.  Denies recollection of diaphoresis during the initial chest pain episode previous night.    Chest Pain   This is a new problem. The current episode started yesterday. The onset quality is sudden. The problem occurs constantly. The problem has been gradually improving. The pain is present in the substernal region. The pain is moderate. The quality of the pain is described as tightness. The pain does not radiate. Associated symptoms include headaches, nausea and palpitations. Pertinent negatives include no dizziness, fever, shortness of breath or vomiting. Treatments tried: Relative breath. Risk factors include male gender (Hypertension).   His past medical history is significant for hypertension.   Nausea  Associated symptoms include chest pain, headaches and nausea. Pertinent negatives include no chills, fever, myalgias, rash, sore throat or vomiting.   Headache  UTI  Associated symptoms include chest pain, headaches and nausea. Pertinent negatives include no chills, fever, myalgias, rash, sore throat or vomiting.     PMH:  has a past medical history of IFG (impaired fasting glucose) (02/08/2024), Morbid obesity (HCC) (01/23/2024), Peritonsillar abscess (02/08/2024), Plantar fasciitis, bilateral (02/08/2024), Primary hypertension (02/08/2024), Snoring (02/08/2024), and Vitamin D deficiency  "(02/08/2024).  MEDS:   Current Outpatient Medications:     therapeutic multivitamin-minerals (THERAGRAN-M) Tab, Take 1 Tablet by mouth every day., Disp: 30 Tablet, Rfl: 0    Cholecalciferol (VITAMIN D) 50 MCG (2000 UT) Cap, Take 1 Capsule by mouth every day., Disp: 30 Capsule, Rfl: 0    b complex vitamins capsule, Take 1 Capsule by mouth every day., Disp: 30 Capsule, Rfl: 0    Ashwagandha 125 MG Cap, Take 1 Capsule by mouth every day., Disp: 30 Capsule, Rfl: 0  ALLERGIES: No Known Allergies  SURGHX: History reviewed. No pertinent surgical history.  SOCHX:  reports that he has never smoked. He has never been exposed to tobacco smoke. He has never used smokeless tobacco. He reports that he does not currently use alcohol. He reports that he does not use drugs.  FH:   Family History   Problem Relation Age of Onset    Heart Disease Mother 50    Hyperlipidemia Father     Hypertension Father     Heart Disease Father     Diabetes Father     Diabetes Brother 20    Hyperlipidemia Brother     Hypertension Brother     Heart Disease Brother     Diabetes Maternal Aunt     Diabetes Paternal Aunt      Review of Systems   Constitutional:  Negative for chills and fever.   HENT:  Negative for sore throat.    Respiratory:  Negative for shortness of breath.    Cardiovascular:  Positive for chest pain and palpitations.   Gastrointestinal:  Positive for nausea. Negative for vomiting.   Genitourinary:  Positive for dysuria (Reports intermittent dysuria over the past 3 days).   Musculoskeletal:  Negative for myalgias.   Skin:  Negative for rash.   Neurological:  Positive for headaches. Negative for dizziness.        Objective:   BP (!) 162/80 (BP Location: Left arm, Patient Position: Sitting, BP Cuff Size: Adult long)   Pulse (!) 101   Temp 36.1 °C (97 °F) (Temporal)   Resp 18   Ht 1.676 m (5' 6\")   Wt (!) 161 kg (355 lb)   SpO2 96%   BMI 57.30 kg/m²   Physical Exam  Vitals and nursing note reviewed.   Constitutional:       General: " He is not in acute distress.     Appearance: He is well-developed.   HENT:      Head: Normocephalic and atraumatic.      Right Ear: External ear normal.      Left Ear: External ear normal.      Nose: Nose normal.      Mouth/Throat:      Mouth: Mucous membranes are moist.   Eyes:      Conjunctiva/sclera: Conjunctivae normal.   Cardiovascular:      Rate and Rhythm: Normal rate.   Pulmonary:      Effort: Pulmonary effort is normal. No respiratory distress.      Breath sounds: Normal breath sounds. No wheezing or rhonchi.   Abdominal:      General: There is no distension.   Musculoskeletal:         General: Normal range of motion.   Skin:     General: Skin is warm and dry.   Neurological:      General: No focal deficit present.      Mental Status: He is alert and oriented to person, place, and time. Mental status is at baseline.      Gait: Gait (gait at baseline) normal.   Psychiatric:         Judgment: Judgment normal.     EKG: Normal sinus rhythm at 87, no ST segment elevation, no ST segment depression, no T wave inversion left anterior fascicular block noted      Assessment/Plan:   1. Chest pain, unspecified type  - EKG - Clinic Performed  - aspirin (Asa) chewable tab 324 mg    2. Palpitations  - EKG - Clinic Performed    3. Elevated blood pressure reading    4. Dysuria  - POCT Urinalysis        Medical Decision Making/Course:  In the context of the clinical presentation of acute chest pain with risk factors there is a clinical concern for acute coronary syndrome and/or other intrathoracic pathology accordingly emergency department evaluation management is warranted.  The [facility] emergency department was advised and transfer center notified.  The patient deferred EMS ambulance transfer and requested transport by private vehicle.  In the course of preparing for this visit with review of the pertinent past medical history, recent and past clinic visits, current medications, and performing chart,  immunization, medical history and medication reconciliation, and in the further course of obtaining the current history pertinent to the clinic visit today, performing an exam and evaluation, ordering and independently evaluating labs, tests including point-of-care urinalysis and EKG, and/or procedures, prescribing any recommended new medications as noted above including administration of aspirin 324 mg orally once during urgent care course, providing any pertinent counseling and education and recommending further coordination of care including contacting coordination with transfer center, at least  45 minutes of total time were spent during this encounter.          Please note that this dictation was created using voice recognition software. I have worked with consultants from the vendor as well as technical experts from [facility] [employer] to optimize the interface. I have made every reasonable attempt to correct obvious errors, but I expect that there are errors of grammar and possibly content that I did not discover before finalizing the note.

## 2024-02-12 NOTE — Clinical Note
Ramakrishna Menezes was seen and treated in our emergency department on 2/12/2024.  He may return to work on 02/13/2024.       If you have any questions or concerns, please don't hesitate to call.      Drew Barth M.D.

## 2024-02-13 ENCOUNTER — PHARMACY VISIT (OUTPATIENT)
Dept: PHARMACY | Facility: MEDICAL CENTER | Age: 34
End: 2024-02-13
Payer: COMMERCIAL

## 2024-02-13 PROCEDURE — RXMED WILLOW AMBULATORY MEDICATION CHARGE: Performed by: EMERGENCY MEDICINE

## 2024-02-13 NOTE — ED NOTES
Assumed care of pt, received bedside report from ELDA Cao    Cardiac and spO2 monitor on, Fall precautions in place, pt is on RA, call light within reach.

## 2024-02-13 NOTE — ED NOTES
Pt discharged home, discharge and follow up care instructions given, prescriptions sent to the pharmacy of choice, pt verbalized understanding. pt ambulated out of ED independently.

## 2024-02-13 NOTE — ED TRIAGE NOTES
"Chief Complaint   Patient presents with    Sent from Urgent Care     CP, palpitations, nausea since last night. Denies SOB.     Chest Pain    Palpitations    Nausea     Pt ambulatory to triage for above. Reports he was told he was hypertensive and to start medications but never did.     BP (!) 141/91   Pulse 94   Temp 36.9 °C (98.4 °F) (Temporal)   Resp 18   Ht 1.676 m (5' 6\")   Wt (!) 160 kg (352 lb 8.3 oz)   SpO2 93%   BMI 56.90 kg/m²     "

## 2024-02-13 NOTE — DISCHARGE INSTRUCTIONS
Your test today show no dangerous cause for your symptoms or findings of heart attack.  With your history of high blood pressure, please take the medication as directed and follow-up with your primary care.  Seek more immediate medical attention for any new or worsening chest pain, passing out or other concerns

## 2024-02-13 NOTE — ED PROVIDER NOTES
ED Provider Note    CHIEF COMPLAINT  Chief Complaint   Patient presents with    Sent from Urgent Care     CP, palpitations, nausea since last night. Denies SOB.     Chest Pain    Palpitations    Nausea       EXTERNAL RECORDS REVIEWED  Outpatient Notes from urgent care earlier today with noted left sided chest pain he had ECG and was given aspirin patient also was admitted January 2024 for peritonsillar abscess    HPI/ROS  LIMITATION TO HISTORY   Select: : None  OUTSIDE HISTORIAN(S):  none    Ramakrishna Menezes Jr. is a 33 y.o. male who presents with chest pain.  Patient reports that last night around 1:00 while he was at work, he began to feel a pounding sensation in his chest, he thought this might be anxiety as he has had this sensation before, however after this he did begin to experience some sharp pain in the right side of his chest.  He states that seem to be worse with twisting type movements, did not seem to be exertional or overly pleuritic, and does feel improved now.  He reports no associated shortness of breath although he did feel lightheaded during the episode this has resolved also.  No radiation to his back or neck, no ripping or tearing sensation.  No recent travel, no leg pain or swelling.  No fevers or chills or cough or congestion.    Patient also reports that he has history of hypertension but declined medications initially for this    PAST MEDICAL HISTORY   has a past medical history of IFG (impaired fasting glucose) (02/08/2024), Morbid obesity (HCC) (01/23/2024), Peritonsillar abscess (02/08/2024), Plantar fasciitis, bilateral (02/08/2024), Primary hypertension (02/08/2024), Snoring (02/08/2024), and Vitamin D deficiency (02/08/2024).    SURGICAL HISTORY  patient denies any surgical history    FAMILY HISTORY  Family History   Problem Relation Age of Onset    Heart Disease Mother 50    Hyperlipidemia Father     Hypertension Father     Heart Disease Father     Diabetes Father     Diabetes Brother  "20    Hyperlipidemia Brother     Hypertension Brother     Heart Disease Brother     Diabetes Maternal Aunt     Diabetes Paternal Aunt        SOCIAL HISTORY  Social History     Tobacco Use    Smoking status: Never     Passive exposure: Never    Smokeless tobacco: Never   Vaping Use    Vaping Use: Never used   Substance and Sexual Activity    Alcohol use: Not Currently    Drug use: Never    Sexual activity: Yes     Partners: Female       CURRENT MEDICATIONS  Home Medications       Reviewed by Kenneth Rodas R.N. (Registered Nurse) on 02/12/24 at 1630  Med List Status: Partial     Medication Last Dose Status   Ashwagandha 125 MG Cap  Active   b complex vitamins capsule  Active   Cholecalciferol (VITAMIN D) 50 MCG (2000 UT) Cap  Active   therapeutic multivitamin-minerals (THERAGRAN-M) Tab  Active                    ALLERGIES  No Known Allergies    PHYSICAL EXAM  VITAL SIGNS: /82   Pulse 86   Temp 36.6 °C (97.8 °F) (Temporal)   Resp 20   Ht 1.676 m (5' 6\")   Wt (!) 160 kg (352 lb 8.3 oz)   SpO2 95%   BMI 56.90 kg/m²      Pulse ox interpretation: I interpret this pulse ox as normal.  Constitutional: Alert in no apparent distress.  HENT: No signs of trauma, Bilateral external ears normal, Nose normal.   Eyes: Pupils are equal and reactive, Conjunctiva normal, Non-icteric.   Neck: Normal range of motion, No tenderness, Supple, No stridor.   Cardiovascular: Regular rate and rhythm, no murmurs.   Thorax & Lungs: Normal breath sounds, No respiratory distress, No wheezing, right chest wall tenderness  Abdomen: Bowel sounds normal, Soft, No tenderness, No masses, No pulsatile masses. No peritoneal signs.  Skin: Warm, Dry, No erythema, No rash.   Back: No bony tenderness, No CVA tenderness.   Extremities: Intact distal pulses, No edema, No tenderness, No cyanosis,  Negative Reggie's sign.   Musculoskeletal: Good range of motion in all major joints. No tenderness to palpation or major deformities noted.   Neurologic: " Alert , Normal motor function, Normal sensory function, No focal deficits noted.   Psychiatric: Affect normal, Judgment normal, Mood normal.               DIAGNOSTIC STUDIES / PROCEDURES  Results for orders placed or performed during the hospital encounter of 02/12/24   TROPONIN   Result Value Ref Range    Troponin T 10 6 - 19 ng/L   CBC WITH DIFFERENTIAL   Result Value Ref Range    WBC 8.2 4.8 - 10.8 K/uL    RBC 5.19 4.70 - 6.10 M/uL    Hemoglobin 15.3 14.0 - 18.0 g/dL    Hematocrit 46.0 42.0 - 52.0 %    MCV 88.6 81.4 - 97.8 fL    MCH 29.5 27.0 - 33.0 pg    MCHC 33.3 32.3 - 36.5 g/dL    RDW 40.2 35.9 - 50.0 fL    Platelet Count 292 164 - 446 K/uL    MPV 11.2 9.0 - 12.9 fL    Neutrophils-Polys 63.10 44.00 - 72.00 %    Lymphocytes 29.00 22.00 - 41.00 %    Monocytes 5.20 0.00 - 13.40 %    Eosinophils 2.40 0.00 - 6.90 %    Basophils 0.20 0.00 - 1.80 %    Immature Granulocytes 0.10 0.00 - 0.90 %    Nucleated RBC 0.00 0.00 - 0.20 /100 WBC    Neutrophils (Absolute) 5.17 1.82 - 7.42 K/uL    Lymphs (Absolute) 2.38 1.00 - 4.80 K/uL    Monos (Absolute) 0.43 0.00 - 0.85 K/uL    Eos (Absolute) 0.20 0.00 - 0.51 K/uL    Baso (Absolute) 0.02 0.00 - 0.12 K/uL    Immature Granulocytes (abs) 0.01 0.00 - 0.11 K/uL    NRBC (Absolute) 0.00 K/uL   CMP   Result Value Ref Range    Sodium 138 135 - 145 mmol/L    Potassium 3.7 3.6 - 5.5 mmol/L    Chloride 105 96 - 112 mmol/L    Co2 22 20 - 33 mmol/L    Anion Gap 11.0 7.0 - 16.0    Glucose 142 (H) 65 - 99 mg/dL    Bun 12 8 - 22 mg/dL    Creatinine 1.15 0.50 - 1.40 mg/dL    Calcium 9.5 8.5 - 10.5 mg/dL    Correct Calcium 9.4 8.5 - 10.5 mg/dL    AST(SGOT) 16 12 - 45 U/L    ALT(SGPT) 20 2 - 50 U/L    Alkaline Phosphatase 68 30 - 99 U/L    Total Bilirubin 0.3 0.1 - 1.5 mg/dL    Albumin 4.1 3.2 - 4.9 g/dL    Total Protein 7.5 6.0 - 8.2 g/dL    Globulin 3.4 1.9 - 3.5 g/dL    A-G Ratio 1.2 g/dL   ESTIMATED GFR   Result Value Ref Range    GFR (CKD-EPI) 85 >60 mL/min/1.73 m 2   TROPONIN   Result Value  Ref Range    Troponin T 9 6 - 19 ng/L         RADIOLOGY  I have independently interpreted the diagnostic imaging associated with this visit and am waiting the final reading from the radiologist.   My preliminary interpretation is as follows: no edema  Radiologist interpretation:   DX-CHEST-PORTABLE (1 VIEW)   Final Result      No acute cardiopulmonary abnormality identified.            COURSE & MEDICAL DECISION MAKING      INITIAL ASSESSMENT, COURSE AND PLAN  Care Narrative: 7:18 PM  Patient is evaluated the bedside and chart is reviewed.  This chest pain, this point differential includes musculoskeletal pain, ACS considered as well, he has no neurologic deficits or symptoms to suggest dissection, he is not persistently tachycardic or hypoxemic and would be PERC low risk for pulmonary embolism, will obtain x-ray, diagnostic labs, ECG        8:35 PM  Patient is reevaluated, he is resting comfortably, well-appearing symptoms improved, agreeable with discharge        PROBLEM LIST  # Chest pain.  At this point no findings of emergent process. ACS is less likely given atypical nature of pain, ECG with no ischemic changes, negative troponin x2 , HEART score of 1 PE is less likely given nature of pain not consistent with PE and PERC negative. Dissection, aneurysm and pneumothorax are unlikely given the pain is resolved, as well as the nature of the pain  as well as Xray lacking evidence of either.  Other non emergent causes such as costochondritis, muscle strain were also considered    # Hypertension, history of, patient reports he was told he needed medications although never started these.  It has been mildly elevated here will start on low-dose lisinopril and he will follow-up with primary care      DISPOSITION AND DISCUSSIONS      Barriers to care at this time, including but not limited to:  none .     Decision tools and prescription drugs considered including, but not limited to: HEART Score 1 .     The patient will  return for new or worsening symptoms and is stable at the time of discharge.    The patient is referred to a primary physician for blood pressure management, diabetic screening, and for all other preventative health concerns.        DISPOSITION:  Patient will be discharged home in stable condition.    FOLLOW UP:  JAYCE DowellNAnnette  [id number] [facility] [address]  [id number]  [place] [address]  [phone]    Schedule an appointment as soon as possible for a visit         OUTPATIENT MEDICATIONS:  New Prescriptions    LISINOPRIL (PRINIVIL) 5 MG TAB    Take 1 Tablet by mouth every day.         FINAL DIAGNOSIS  1. Chest pain, unspecified type    2. Hypertension, unspecified type           Electronically signed by: Drew Barth M.D., 2/12/2024 6:42 PM

## 2024-03-15 ENCOUNTER — HOSPITAL ENCOUNTER (OUTPATIENT)
Dept: LAB | Facility: MEDICAL CENTER | Age: 34
End: 2024-03-15
Attending: NURSE PRACTITIONER
Payer: COMMERCIAL

## 2024-03-15 DIAGNOSIS — R73.01 IFG (IMPAIRED FASTING GLUCOSE): ICD-10-CM

## 2024-03-15 DIAGNOSIS — Z11.59 NEED FOR HEPATITIS B SCREENING TEST: ICD-10-CM

## 2024-03-15 DIAGNOSIS — I10 PRIMARY HYPERTENSION: ICD-10-CM

## 2024-03-15 DIAGNOSIS — E55.9 VITAMIN D DEFICIENCY: ICD-10-CM

## 2024-03-15 DIAGNOSIS — R53.83 FATIGUE, UNSPECIFIED TYPE: ICD-10-CM

## 2024-03-15 LAB
25(OH)D3 SERPL-MCNC: 25 NG/ML (ref 30–100)
CHOLEST SERPL-MCNC: 197 MG/DL (ref 100–199)
FASTING STATUS PATIENT QL REPORTED: NORMAL
HBV SURFACE AB SERPL IA-ACNC: 25.3 MIU/ML (ref 0–10)
HDLC SERPL-MCNC: 41 MG/DL
LDLC SERPL CALC-MCNC: 145 MG/DL
TRIGL SERPL-MCNC: 56 MG/DL (ref 0–149)

## 2024-03-15 PROCEDURE — 84270 ASSAY OF SEX HORMONE GLOBUL: CPT

## 2024-03-15 PROCEDURE — 82570 ASSAY OF URINE CREATININE: CPT

## 2024-03-15 PROCEDURE — 84403 ASSAY OF TOTAL TESTOSTERONE: CPT

## 2024-03-15 PROCEDURE — 84402 ASSAY OF FREE TESTOSTERONE: CPT

## 2024-03-15 PROCEDURE — 80061 LIPID PANEL: CPT

## 2024-03-15 PROCEDURE — 36415 COLL VENOUS BLD VENIPUNCTURE: CPT

## 2024-03-15 PROCEDURE — 82306 VITAMIN D 25 HYDROXY: CPT

## 2024-03-15 PROCEDURE — 82043 UR ALBUMIN QUANTITATIVE: CPT

## 2024-03-15 PROCEDURE — 86706 HEP B SURFACE ANTIBODY: CPT

## 2024-03-16 LAB
CREAT UR-MCNC: 204.93 MG/DL
MICROALBUMIN UR-MCNC: 2.4 MG/DL
MICROALBUMIN/CREAT UR: 12 MG/G (ref 0–30)

## 2024-03-17 LAB
SHBG SERPL-SCNC: 33 NMOL/L (ref 17–56)
TESTOST FREE MFR SERPL: 1.9 % (ref 1.6–2.9)
TESTOST FREE SERPL-MCNC: 81 PG/ML (ref 47–244)
TESTOST SERPL-MCNC: 437 NG/DL (ref 300–1080)

## 2024-03-25 ENCOUNTER — APPOINTMENT (OUTPATIENT)
Dept: MEDICAL GROUP | Facility: MEDICAL CENTER | Age: 34
End: 2024-03-25
Payer: COMMERCIAL

## 2024-03-27 ENCOUNTER — OFFICE VISIT (OUTPATIENT)
Dept: MEDICAL GROUP | Facility: MEDICAL CENTER | Age: 34
End: 2024-03-27
Payer: COMMERCIAL

## 2024-03-27 VITALS
TEMPERATURE: 99 F | OXYGEN SATURATION: 94 % | BODY MASS INDEX: 56.9 KG/M2 | HEART RATE: 78 BPM | HEIGHT: 66 IN | SYSTOLIC BLOOD PRESSURE: 120 MMHG | DIASTOLIC BLOOD PRESSURE: 82 MMHG

## 2024-03-27 DIAGNOSIS — I10 HYPERTENSION, UNSPECIFIED TYPE: ICD-10-CM

## 2024-03-27 DIAGNOSIS — E78.5 HYPERLIPIDEMIA LDL GOAL <100: ICD-10-CM

## 2024-03-27 DIAGNOSIS — E55.9 VITAMIN D DEFICIENCY: ICD-10-CM

## 2024-03-27 DIAGNOSIS — R73.01 IFG (IMPAIRED FASTING GLUCOSE): ICD-10-CM

## 2024-03-27 DIAGNOSIS — G47.30 SLEEP APNEA, UNSPECIFIED TYPE: ICD-10-CM

## 2024-03-27 PROCEDURE — RXMED WILLOW AMBULATORY MEDICATION CHARGE: Performed by: NURSE PRACTITIONER

## 2024-03-27 PROCEDURE — 3074F SYST BP LT 130 MM HG: CPT | Performed by: NURSE PRACTITIONER

## 2024-03-27 PROCEDURE — 99214 OFFICE O/P EST MOD 30 MIN: CPT | Performed by: NURSE PRACTITIONER

## 2024-03-27 PROCEDURE — 3079F DIAST BP 80-89 MM HG: CPT | Performed by: NURSE PRACTITIONER

## 2024-03-27 RX ORDER — LISINOPRIL 5 MG/1
5 TABLET ORAL DAILY
Qty: 90 TABLET | Refills: 1 | Status: SHIPPED | OUTPATIENT
Start: 2024-03-27

## 2024-03-27 NOTE — PROGRESS NOTES
Subjective:     Ramakrishna Menezes Jr. is a 33 y.o. male who presents with HTN.    HPI:     Seen in f/u for HTN    Problem #1:HTN    He has been out of his lisinopril for about 3 days but his bp is ok currently.  He has been juicing during week.  He has been dec meat intake.  He is eating more plant based.  He is not exercising d/t his multiple jobs and school.  He needs lisinopril refilled.  No s/e to med.      Problem #2:sleep apnea  He went to dentist for poss braces.  Dentist told hm that he had to have sleep study first.  His wife tells him that he snores and stops breathing.  Today he is tired despite sleeping well last nite. He needs sleep study    Problem #3: vitamin d def  He saw that his vitamin d was low when he looked at his lab.  He started otc supplement 5000 units daily.    Problem #4: hyperlipidemia  His LP shows good trg but HDL and LDL not at goal.  He has already improved his diet. Not exercising yet.  Will not start statin until has time to improve healthy lifestyle.     Problem #5:  IFG  He will be due updated A1c in 6 mo. He is not on med for DM at this time.  Does having FH of multiple members with DMDictation #1  MRN:[id number]  CSN:[id number]     Lab results reviewed with pt: hep B AB shows immunity.  Testosterone is wnl   Vitamin d is low.  He started 5000 units dialy otc when he saw the lab  LP shows trg at goal.  HDL is low at 41.  LDL is high at 145.  No lab to compare.  Alb/cr ratio is wnl      Patient Active Problem List    Diagnosis Date Noted    IFG (impaired fasting glucose) 02/08/2024    Peritonsillar abscess 02/08/2024    Primary hypertension 02/08/2024    Plantar fasciitis, bilateral 02/08/2024    Snoring 02/08/2024    Vitamin D deficiency 02/08/2024    Morbid obesity (HCC) 01/23/2024       Current medicines (including changes today)  Current Outpatient Medications   Medication Sig Dispense Refill    lisinopril (PRINIVIL) 5 MG Tab Take 1 Tablet by mouth every day. 90 Tablet 1     therapeutic multivitamin-minerals (THERAGRAN-M) Tab Take 1 Tablet by mouth every day. 30 Tablet 0    Cholecalciferol (VITAMIN D) 50 MCG (2000 UT) Cap Take 1 Capsule by mouth every day. 30 Capsule 0    b complex vitamins capsule Take 1 Capsule by mouth every day. 30 Capsule 0    Ashwagandha 125 MG Cap Take 1 Capsule by mouth every day. 30 Capsule 0     No current facility-administered medications for this visit.       No Known Allergies        Microalbumin/creatinine Ratio:  Lab Results   Component Value Date/Time    URCREAT 204.93 03/15/2024 1030    MICROALBUR 2.4 03/15/2024 1030    MALBCRT 12 03/15/2024 1030       Lipid Panel:  Lab Results   Component Value Date/Time    CHOLSTRLTOT 197 03/15/2024 1031    TRIGLYCERIDE 56 03/15/2024 1031     (H) 03/15/2024 1031       Complete Metabolic Panel:    Lab Results   Component Value Date/Time    SODIUM 138 02/12/2024 04:36 PM    POTASSIUM 3.7 02/12/2024 04:36 PM    CHLORIDE 105 02/12/2024 04:36 PM    CO2 22 02/12/2024 04:36 PM    ANION 11.0 02/12/2024 04:36 PM    GLUCOSE 142 (H) 02/12/2024 04:36 PM    BUN 12 02/12/2024 04:36 PM    CREATININE 1.15 02/12/2024 04:36 PM    ASTSGOT 16 02/12/2024 04:36 PM    ALTSGPT 20 02/12/2024 04:36 PM    TBILIRUBIN 0.3 02/12/2024 04:36 PM    ALBUMIN 4.1 02/12/2024 04:36 PM    TOTPROTEIN 7.5 02/12/2024 04:36 PM    GLOBULIN 3.4 02/12/2024 04:36 PM    AGRATIO 1.2 02/12/2024 04:36 PM         Vitamin D:    Lab Results   Component Value Date/Time    25HYDROXY 25 (L) 03/15/2024 1031       GFR:    Lab Results   Component Value Date/Time    GFRCKD 85 02/12/2024 1636       ROS  Constitutional: Negative. Negative for fever, chills, weight loss, diaphoresis.   HENT: Negative. Negative for hearing loss, ear pain, nosebleeds, congestion, sore throat, neck pain, tinnitus and ear discharge.   Respiratory: Negative. Negative for cough, hemoptysis, sputum production, shortness of breath, wheezing and stridor.   Cardiovascular: Negative. Negative for  "chest pain, palpitations, orthopnea, claudication, leg swelling and PND.   Gastrointestinal: Denies nausea, vomiting, diarrhea, constipation, heartburn, melena or hematochezia.  Genitourinary: Denies dysuria, hematuria, urinary incontinence, frequency or urgency.        Objective:     /82 (BP Location: Left arm, Patient Position: Sitting, BP Cuff Size: Adult)   Pulse 78   Temp 37.2 °C (99 °F) (Temporal)   Ht 1.676 m (5' 6\")   SpO2 94%  Body mass index is 56.9 kg/m².    Physical Exam:  Vitals reviewed.  Constitutional: Oriented to person, place, and time. appears well-developed and well-nourished. No distress.   Cardiovascular: Normal rate, regular rhythm, normal heart sounds and intact distal pulses. Exam reveals no gallop and no friction rub. No murmur heard. No carotid bruits.   Pulmonary/Chest: Effort normal and breath sounds normal. No stridor. No respiratory distress. no wheezes or rales. exhibits no tenderness.   Musculoskeletal: Normal range of motion. exhibits no edema. mary ellen pedal pulses 2+.  Lymphadenopathy: No cervical or supraclavicular adenopathy.   Neurological: Alert and oriented to person, place, and time. exhibits normal muscle tone.  Skin: Skin is warm and dry. No diaphoresis.   Psychiatric: Normal mood and affect. Behavior is normal.      Assessment and Plan:     The following treatment plan was discussed:    1. Hypertension, unspecified type  lisinopril (PRINIVIL) 5 MG Tab    MICROALBUMIN CREAT RATIO URINE    RF med.  stable on lisinopril.  do lab in 6 mo. f/u for review.      2. IFG (impaired fasting glucose)  MICROALBUMIN CREAT RATIO URINE    HEMOGLOBIN A1C    he will be due updated a1c in 6 mo.  not on med for DM.      3. Sleep apnea, unspecified type  Referral to Pulmonary and Sleep Medicine    + sx of sleep apnea w/snoring and apnea. refer pulm sleep med.      4. Hyperlipidemia LDL goal <100  Comp Metabolic Panel    Lipid Profile    LP shows trg wnl but HDL and LDL not at goal. he " has improved his diet. enc pt to start regular exercise. [facility] lab 6 mo. f/u for reviw      5. Vitamin D deficiency  VITAMIN D,25 HYDROXY (DEFICIENCY)    continue D3 otc supplement 5000 units for next month then dec to 2000 units daily            Followup: Return in about 6 months (around 9/27/2024), or for lab review.

## 2024-03-28 ENCOUNTER — PHARMACY VISIT (OUTPATIENT)
Dept: PHARMACY | Facility: MEDICAL CENTER | Age: 34
End: 2024-03-28
Payer: COMMERCIAL

## 2024-04-25 ENCOUNTER — TELEPHONE (OUTPATIENT)
Dept: HEALTH INFORMATION MANAGEMENT | Facility: OTHER | Age: 34
End: 2024-04-25
Payer: COMMERCIAL

## 2024-06-06 ENCOUNTER — OFFICE VISIT (OUTPATIENT)
Dept: SLEEP MEDICINE | Facility: MEDICAL CENTER | Age: 34
End: 2024-06-06
Attending: NURSE PRACTITIONER
Payer: COMMERCIAL

## 2024-06-06 VITALS
HEIGHT: 66 IN | WEIGHT: 315 LBS | SYSTOLIC BLOOD PRESSURE: 110 MMHG | RESPIRATION RATE: 16 BRPM | HEART RATE: 101 BPM | DIASTOLIC BLOOD PRESSURE: 60 MMHG | OXYGEN SATURATION: 96 % | BODY MASS INDEX: 50.62 KG/M2

## 2024-06-06 DIAGNOSIS — R06.81 WITNESSED EPISODE OF APNEA: ICD-10-CM

## 2024-06-06 DIAGNOSIS — G47.30 BREATHING-RELATED SLEEP DISORDER: ICD-10-CM

## 2024-06-06 DIAGNOSIS — E66.01 MORBID OBESITY (HCC): ICD-10-CM

## 2024-06-06 DIAGNOSIS — G47.19 EXCESSIVE DAYTIME SLEEPINESS: ICD-10-CM

## 2024-06-06 DIAGNOSIS — R06.83 SNORING: ICD-10-CM

## 2024-06-06 PROCEDURE — 99213 OFFICE O/P EST LOW 20 MIN: CPT | Performed by: PREVENTIVE MEDICINE

## 2024-06-06 PROCEDURE — 3078F DIAST BP <80 MM HG: CPT | Performed by: PREVENTIVE MEDICINE

## 2024-06-06 PROCEDURE — 99204 OFFICE O/P NEW MOD 45 MIN: CPT | Performed by: PREVENTIVE MEDICINE

## 2024-06-06 PROCEDURE — 3074F SYST BP LT 130 MM HG: CPT | Performed by: PREVENTIVE MEDICINE

## 2024-06-06 ASSESSMENT — ENCOUNTER SYMPTOMS
TIME GOING TO BED: 11:30PM
SLEEP DISTURBANCE: 0

## 2024-06-06 ASSESSMENT — FIBROSIS 4 INDEX: FIB4 SCORE: 0.42

## 2024-06-06 NOTE — PROGRESS NOTES
"CHIEF COMPLIANT: \"Establish care.\"  HISTORY OF PRESENT ILLNESS:  Ramakrishna Menezes Jr. is a 33 y.o. male kindly referred by DARON Dowell and  is here for a sleep medicine consultation.     Sleep History:  Ramakrishna Menezes Jr. has never been tested for sleep apnea. The patient reports severe daytime sleepiness, fragmented sleep, loud snoring, apneas, frequent morning headaches, and periods of sleep paralysis.      The patient goes to bed at 11:30 pm and wakes up at 6 am. It usually takes the patient approximately 5-10 mins to fall asleep. The patient does not  feel refreshed after sleeping and does not  nap during the day. The patient has never used tobacco.  This pt does not currently drink  alcoholic beverages  and has 0-1 caffeinated beverages daily.     The patient denies any symptoms of narcolepsy such as sleep paralysis or cataplexy, or any symptoms that suggest parasomnias such as sleep walking or acting out of dreams.    Ramakrishna Menezes Jr. Works two jobs.  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 Tues and Wed. 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       Endorses family members who use PAP therapy/snore:  unknown    EPWORTH SCORE:    24  /24, this is   consistent with EDS      Significant comorbidities and modifying factors: see below    PAST MEDICAL HISTORY:  Past Medical History:   Diagnosis Date    Apnea, sleep     Daytime sleepiness     Dizziness     Earache     Fatigue     Frequent headaches     Frequent urination     Gasping for breath     Heartburn     IFG (impaired fasting glucose) 02/08/2024    Morbid obesity (HCC) 01/23/2024    Morning headache     Painful joint     Peritonsillar abscess 02/08/2024    Plantar fasciitis, bilateral 02/08/2024    Primary hypertension 02/08/2024    Ringing in ears     Snoring 02/08/2024    Swelling of lower extremity     Vitamin D deficiency 02/08/2024    Weakness       PROBLEM LIST:  Patient Active Problem List    Diagnosis " Date Noted    IFG (impaired fasting glucose) 02/08/2024    Peritonsillar abscess 02/08/2024    Primary hypertension 02/08/2024    Plantar fasciitis, bilateral 02/08/2024    Snoring 02/08/2024    Vitamin D deficiency 02/08/2024    Morbid obesity (HCC) 01/23/2024     PAST SOCIAL HISTORY:  History reviewed. No pertinent surgical history.  PAST FAMILY HISTORY:  Family History   Problem Relation Age of Onset    Heart Disease Mother 50    Hyperlipidemia Father     Hypertension Father     Heart Disease Father     Diabetes Father     Diabetes Brother 20    Hyperlipidemia Brother     Hypertension Brother     Heart Disease Brother     Diabetes Maternal Aunt     Diabetes Paternal Aunt      SOCIAL HISTORY:  Social History     Socioeconomic History    Marital status:      Spouse name: Not on file    Number of children: Not on file    Years of education: Not on file    Highest education level: Bachelor's degree (e.g., BA, AB, BS)   Occupational History    Not on file   Tobacco Use    Smoking status: Never     Passive exposure: Never    Smokeless tobacco: Never   Vaping Use    Vaping status: Never Used   Substance and Sexual Activity    Alcohol use: Not Currently    Drug use: Never    Sexual activity: Yes     Partners: Female   Other Topics Concern    Not on file   Social History Narrative    Not on file     Social Determinants of Health     Financial Resource Strain: Low Risk  (2/8/2024)    Overall Financial Resource Strain (CARDIA)     Difficulty of Paying Living Expenses: Not hard at all   Food Insecurity: No Food Insecurity (2/8/2024)    Hunger Vital Sign     Worried About Running Out of Food in the Last Year: Never true     Ran Out of Food in the Last Year: Never true   Transportation Needs: No Transportation Needs (2/8/2024)    PRAPARE - Transportation     Lack of Transportation (Medical): No     Lack of Transportation (Non-Medical): No   Physical Activity: Insufficiently Active (2/8/2024)    Exercise Vital Sign      "Days of Exercise per Week: 1 day     Minutes of Exercise per Session: 10 min   Stress: No Stress Concern Present (2/8/2024)    [employer] - Occupational Stress Questionnaire     Feeling of Stress : Not at all   Social Connections: Moderately Isolated (2/8/2024)    Social Connection and Isolation Panel [NHANES]     Frequency of Communication with Friends and Family: More than three times a week     Frequency of Social Gatherings with Friends and Family: Never     Attends Holiness Services: Never     Active Member of Clubs or Organizations: No     Attends Club or Organization Meetings: Never     Marital Status:    Intimate Partner Violence: Not on file   Housing Stability: Unknown (2/8/2024)    Housing Stability Vital Sign     Unable to Pay for Housing in the Last Year: No     Number of Places Lived in the Last Year: Not on file     Unstable Housing in the Last Year: No     ALLERGIES: Patient has no known allergies.  MEDICATIONS:  Current Outpatient Medications   Medication Sig Dispense Refill    lisinopril (PRINIVIL) 5 MG Tab Take 1 Tablet by mouth every day. 90 Tablet 1    therapeutic multivitamin-minerals (THERAGRAN-M) Tab Take 1 Tablet by mouth every day. 30 Tablet 0    Cholecalciferol (VITAMIN D) 50 MCG (2000 UT) Cap Take 1 Capsule by mouth every day. 30 Capsule 0    b complex vitamins capsule Take 1 Capsule by mouth every day. 30 Capsule 0    Ashwagandha 125 MG Cap Take 1 Capsule by mouth every day. 30 Capsule 0     No current facility-administered medications for this visit.    \"CURRENT RX\"    REVIEW OF SYSTEMS:  Constitutional: Denies weight loss, endorses chronic daytime fatigue --also see HPI    PHYSICAL EXAM/VITALS:  /60 (BP Location: Left arm, Patient Position: Sitting, BP Cuff Size: Large adult)   Pulse (!) 101   Resp 16   Ht 1.676 m (5' 6\")   Wt (!) 165 kg (364 lb 8 oz)   SpO2 96%   BMI 58.83 kg/m²   Neck circumference (inches): 20  Appearance: " Well-nourished, well-developed,  looks stated age, no acute distress  Eyes:   EOMI  ENMT:   Hard palate narrow: No   Hard palate high: No   Soft palate/uvula (Mallampati score): 4  Tongue Scalloping: No   Retrognathia:  No   Micrognathia:  No    Neck: Supple, trachea midline  Respiratory effort:  No intercostal retractions or use of accessory muscles  Lung auscultation:  No wheezes rhonchi rubs or rales  Cardiac: No murmurs, rubs, or gallops; regular rhythm, normal rate; no edema  Musculoskeletal:  Grossly normal; gait and station normal  Neurologic:  oriented to person, time, place, and purpose; judgement intact  Psychiatric:  No depression, anxiety, agitation    MEDICAL DECISION MAKING:  The medical record was reviewed as it pertains to this referral. This includes records from primary care,consultants notes, referral request, hospital records, labs and imaging. Any available diagnostic and titration nocturnal polysomnograms, home sleep apnea tests, continuous nocturnal oximetry results, multiple sleep latency tests, and recent compliance reports were reviewed with the patient.    ASSESSMENT/PLAN:  Ramakrishna Menezes Jr. is a 33 y.o. male  who has a high pretest probability of having obstructive sleep apnea.  This patient is aware that if the sleep study should come back with severe MOHSEN or severe nocturnal oxygen desaturation then he will need to do an in lab study to identify proper treatment.        DIAGNOSES :    1. Breathing-related sleep disorder  - Overnight Home Sleep Study; Future    2. Snoring  - Overnight Home Sleep Study; Future    3. Witnessed episode of apnea  - Overnight Home Sleep Study; Future    4. Excessive daytime sleepiness  - Overnight Home Sleep Study; Future    5. Morbid obesity (HCC)  - Overnight Home Sleep Study; Future    The patient has signs and symptoms consistent with obstructive sleep apnea hypopnea syndrome. Will schedule a nocturnal polysomnogram or a home sleep apnea test (please see  plan).  The risks of untreated sleep apnea were discussed with the patient at length. Patients with MOHSEN are at increased risk of cardiovascular disease including coronary artery disease, systemic arterial hypertension, pulmonary arterial hypertension, cardiac arrhythmias, and stroke. MOHSEN patients have an increased risk of motor vehicle accidents, type 2 diabetes, chronic kidney disease, and non-alcoholic liver disease. The patient was advised to avoid driving a motor vehicle when drowsy.  Have advised the patient to follow up with the appropriate healthcare practitioners for all other medical problems and issues.    RETURN TO CLINIC: Return for 3 weeks after SS - discuss results w/ any provider  or first available w/ [facility].    My total time spent caring for the patient on the day of the encounter was 40 minutes. This includes time spent on a thorough chart review including other physician notes, all sleep studies, as well as critical labs and pulmonary and cardiac studies.  Additionally, it includes a thorough discussion of good sleep hygiene and stimulus control, as well as  the need for consistency in terms of sleep preparation and practice.    Please note that this dictation was created using voice recognition software.  I have made every reasonable attempt to correct obvious errors, I expect that there are errors of grammar and possibly content that I did not discover before finalizing this note.                    Answers submitted by the patient for this visit:  Sleep Center Questionnaire (Submitted on 6/6/2024)  Year of your last physical exam: Not sure  Results of exam: Not sure  Occupation :   Height: 5’6  Current weight: 350  6 months ago: 450  At age 19: 200  What is the reason for your visit today?: Sleep Apnea and etc  Name of person referring you to the Sleep Center: My docter  Have you ever been hospitalized?: Yes  Reason, year, and hospital in which you were hospitalized::  Swollen throat  Have you ever had problems with anesthesia?: No  Have you experienced post-operative delirium?: No  Any complications with surgery?: No  What year did you receive your last Flu shot?: Yes and not sure  What year did you receive you last Pneumonia shot?: Not sure  Have you had a TB skin test? If so, please list the year and result:: Yes  Have you had Allergy skin testing? If so, please list the year and result:: No  Please briefly describe your sleep problem and how old you were when it began.: Feel fatigue and not getting enough sleep when i do and fall asleep when im talking to my wife  How does this affect your daily life and activities? Please also rate how serious of a problem this is (1 = Not at all, 10 = Very Serious).: 10  Have you had any previous evaluations, examinations, or treatment for this sleep problem or any other problems with your sleep? If so, please describe the evaluation, treatment, and results.: No  Have you used any medications (prescribed or otherwise) to help your sleep problem? If yes, include name, amount, frequency, and the prescribing physician.: No  If employed, what time do you usually start and end work?: 7am 11pm  Do you ever change work shifts? If yes, describe how often (never, infrequently, regularly).: I used to work [id number]  What time do you usually go to bed and wake up on: Weekdays? Weekends?: 11:30pm  Do you have a regular bed partner?: Yes  How many minutes does it usually take to fall asleep at night after turning off the lights?: 10mins  What do you ordinarily do just prior to turning out the lights and attempting to go to sleep (e.g., reading, TV, baths, etc.)?: Take a shower then goto sleep  On average, how many times do you wake up during the night?: Not sure maybe 3 or 4 times  On average, how many times do you wake up to use the bathroom?: 2times  Do you often wake up too early in the morning and are unable to return to sleep?: Yes  On average, how  many hours of sleep do you get per night?: 6 to 7 hours  How do you usually awaken?  Alarm, spontaneously, or other?: Alarm  Is it difficult for you to awaken and get out of bed after sleeping? (Not at all, Sometimes, Very): Yes  Do you nap or return to bed after arising?: I will nap  Are you bothered by sleepiness during the day?: Yes  Do you feel that you get too much sleep at night?: No  Do you feel that you get too little sleep at night?: No  Do you usually feel tired during the day? If so, what do you attribute this to?: Yes nothing  Do you find yourself falling asleep when you don't mean to? : Yes  If yes, how long does your sleep episode last?: Like a couple seconds sometimes 1min  Do you feel rested or refreshed after the sleep episode?: No  Have you ever suddenly fallen?: Yes  Have you ever experienced sudden body weakness?: Yes  If yes, were you aware of the things around you?: Yes  Have you ever experienced weakness or paralysis upon going to sleep?: Yes  Have you ever experienced weakness or paralysis upon awakening from sleep?: Yes  If yes for either of the above two questions, please indicate how many times per week does this occur?: Once a week  Have you ever experienced seeing things or hearing voices/noises: That weren't real? On going to sleep? During the night? On awakening from sleep? During the day?: I seen things before  Do you have difficulty breathing at night? If yes, briefly describe.: My wife said i stop breathing when sleepong  How many times per week?: 7  How did you become aware of this, at what age did this first occur, and how many years has this occurred?: Age 19  Have you been told you snore while asleep? If so, does it disturb a bed partner (or someone in the same room), or someone in the next room?: Yes and yes  Have you ever experienced doing something without being aware of the action? If yes, please describe.: No  How many times per week does this occur?: N/a  Have you ever  "experienced upon lying in bed before sleep or on awakening from sleep: Restlessness of legs, \"nervous legs\", \"creeping crawling\" sensation of legs, or twitching of legs?: No  How many times per week does this occur, and how many minutes does the sensation last?: N/a  Does anything relieve the sensations (e.g., getting out of bed, medication, massage)?: No  At what age did this first occur, and how many years has this occurred?: N/a  Have you ever been told that your arms or legs jerk or twitch while you are asleep? If yes, how many times per night does this occur?: No  At what age did this first occur, and how many years has this occurred?: N/A  Does this seem to awaken you from your sleep?: No  Do you know, or have you ever been told that you do any of the following while sleeping: talk, walk, grit teeth, wet the bed, wake up screaming or seemingly afraid, have disturbing dreams, have unusual movements, wake up with headaches, (males) have erections? If yes to any of these, please indicate how many times per week, age started, last occurrence, treatment received.: I talk while sleep and have scream while sleeping and have waken up with a headache and no erections. Everyday the headache’s happen and the screaming happen every so often and this been happening since i was 24  Has anyone in your family been known to have any sleep problems? If yes, please list the type of problem (e.g., trouble getting to sleep, too sleepy, bed wetting, etc.), the relationship of this person to you, and the treatment received.: No    "

## 2024-06-24 ENCOUNTER — HOME STUDY (OUTPATIENT)
Dept: SLEEP MEDICINE | Facility: MEDICAL CENTER | Age: 34
End: 2024-06-24
Attending: PREVENTIVE MEDICINE
Payer: COMMERCIAL

## 2024-06-24 DIAGNOSIS — G47.19 EXCESSIVE DAYTIME SLEEPINESS: ICD-10-CM

## 2024-06-24 DIAGNOSIS — R06.81 WITNESSED EPISODE OF APNEA: ICD-10-CM

## 2024-06-24 DIAGNOSIS — E66.01 MORBID OBESITY (HCC): ICD-10-CM

## 2024-06-24 DIAGNOSIS — G47.30 BREATHING-RELATED SLEEP DISORDER: ICD-10-CM

## 2024-06-24 DIAGNOSIS — R06.83 SNORING: ICD-10-CM

## 2024-06-24 PROCEDURE — 95800 SLP STDY UNATTENDED: CPT | Performed by: STUDENT IN AN ORGANIZED HEALTH CARE EDUCATION/TRAINING PROGRAM

## 2024-06-26 NOTE — PROCEDURES
DIAGNOSTIC HOME SLEEP TEST (HST) REPORT WatchPAT      PATIENT ID:  NAME:  Ramakrishna Menezes  MRN:               [id number]  YOB: 1990  DATE OF STUDY: 6/25/2024      Impression:     This study shows evidence of:      1. Severe obstructive sleep apnea with PAT apnea hypopnea index(pAHI) of 84.3 per hour.  PAT respiratory disturbance index (pRDI) was 84.3 per hour. These findings are based on 7 channels recording of PAT signal with sleep staging, heart rate, pulse oximetry, actigraphy, body position, snoring and respiratory movement.     2. Oxygenation O2 Sat. mean O2 sat was 91%,  ravin was 75%,  and maximum O2 at 99 %. O2 sat was at or  below 88% for 52 min of evaluation time. Oxygen Desaturation (>=4%) Index was 72.3/hr. AVG HR was 86 BPM.      TECHNICAL DESCRIPTION: Patient underwent home sleep apnea testing with peripheral arterial tone signal (WatchPAT™). This is a Type IV portable monitor and device per Medicare. Monitoring was done with 7 channels recording of PAT signal with sleep staging, heart rate, pulse oximetry, actigraphy, body position, snoring and respiratory movement. Prior to using the device, the patient received verbal and written instructions for its application and was provided with the help desk phone number for additional telephonic instruction with 24-hour availability of qualified personnel to answer questions.    Respiratory events:      General sleep summary: . Total recording time is 8 hours and 18 minutes and total Sleep time is 7 hours and 47 minutes. The patient spent 163 minutes in the supine position and 302 minutes in the nonsupine position.      Recommendations:    1. CPAP titration study vs Auto CPAP trial.   2. In general patients with sleep apnea are advised to avoid alcohol and sedatives and to not operate a motor vehicle while drowsy. In some cases alternative treatment options may prove effective in resolving sleep apnea in these options include upper  airway surgery, the use of a dental orthotic or weight loss and positional therapy. Clinical correlation is required.         Tyson Brody MD

## 2024-06-27 PROCEDURE — RXMED WILLOW AMBULATORY MEDICATION CHARGE: Performed by: NURSE PRACTITIONER

## 2024-06-28 ENCOUNTER — OFFICE VISIT (OUTPATIENT)
Dept: SLEEP MEDICINE | Facility: MEDICAL CENTER | Age: 34
End: 2024-06-28
Attending: NURSE PRACTITIONER
Payer: COMMERCIAL

## 2024-06-28 ENCOUNTER — PHARMACY VISIT (OUTPATIENT)
Dept: PHARMACY | Facility: MEDICAL CENTER | Age: 34
End: 2024-06-28
Payer: COMMERCIAL

## 2024-06-28 VITALS
BODY MASS INDEX: 50.62 KG/M2 | OXYGEN SATURATION: 95 % | HEART RATE: 88 BPM | WEIGHT: 315 LBS | RESPIRATION RATE: 16 BRPM | HEIGHT: 66 IN | SYSTOLIC BLOOD PRESSURE: 110 MMHG | DIASTOLIC BLOOD PRESSURE: 78 MMHG

## 2024-06-28 DIAGNOSIS — G47.33 OSA (OBSTRUCTIVE SLEEP APNEA): ICD-10-CM

## 2024-06-28 PROCEDURE — 99213 OFFICE O/P EST LOW 20 MIN: CPT | Performed by: NURSE PRACTITIONER

## 2024-06-28 ASSESSMENT — FIBROSIS 4 INDEX: FIB4 SCORE: 0.42

## 2024-06-28 NOTE — PROGRESS NOTES
Chief Complaint   Patient presents with    Follow-Up     SLEEP - RESULTS - CHART PREP COMPLETED ON 06/26/2024    Last Office Visit 06/06/2024 with Dr. Lorenzana    Sleep Study Complete on 06/25/2024     OPO Completed on N/A Pressures Completed on N/A    Raw Data in Media? YES , If raw data is missing has sleep tech been notified? N/A    Interp Completed? NO , If pending has provider been notified to complete? Yes          HPI:  Ramakrishna Menezes Jr. is a 33 y.o. year old male here today for follow-up on sleep study results.  Last seen 6/6/2024. The patient reports severe daytime sleepiness, fragmented sleep, loud snoring, apneas, frequent morning headaches, and periods of sleep paralysis.       The patient goes to bed at 11:30 pm and wakes up at 6 am. It usually takes the patient approximately 5-10 mins to fall asleep. The patient does not  feel refreshed after sleeping and does not  nap during the day. The patient has never used tobacco.  This pt does not currently drink  alcoholic beverages  and has 0-1 caffeinated beverages daily.      The patient denies any symptoms of narcolepsy such as sleep paralysis or cataplexy, or any symptoms that suggest parasomnias such as sleep walking or acting out of dreams.      Home sleep study (6/24/2024):   1. Severe obstructive sleep apnea with PAT apnea hypopnea index(pAHI) of 84.3 per hour.  PAT respiratory disturbance index (pRDI) was 84.3 per hour. These findings are based on 7 channels recording of PAT signal with sleep staging, heart rate, pulse oximetry, actigraphy, body position, snoring and respiratory movement.      2. Oxygenation O2 Sat. mean O2 sat was 91%,  ravin was 75%,  and maximum O2 at 99 %. O2 sat was at or  below 88% for 52 min of evaluation time. Oxygen Desaturation (>=4%) Index was 72.3/hr. AVG HR was 86 BPM.       ROS: As per HPI and otherwise negative if not stated.    Past Medical History:   Diagnosis Date    Apnea, sleep     Daytime sleepiness     Dizziness   "   Earache     Fatigue     Frequent headaches     Frequent urination     Gasping for breath     Heartburn     IFG (impaired fasting glucose) 02/08/2024    Morbid obesity (HCC) 01/23/2024    Morning headache     Painful joint     Peritonsillar abscess 02/08/2024    Plantar fasciitis, bilateral 02/08/2024    Primary hypertension 02/08/2024    Ringing in ears     Snoring 02/08/2024    Swelling of lower extremity     Vitamin D deficiency 02/08/2024    Weakness        No past surgical history on file.    Family History   Problem Relation Age of Onset    Heart Disease Mother 50    Hyperlipidemia Father     Hypertension Father     Heart Disease Father     Diabetes Father     Diabetes Brother 20    Hyperlipidemia Brother     Hypertension Brother     Heart Disease Brother     Diabetes Maternal Aunt     Diabetes Paternal Aunt        Allergies as of 06/28/2024    (No Known Allergies)        Vitals:  /78 (BP Location: Left arm, Patient Position: Sitting, BP Cuff Size: Adult)   Pulse 88   Resp 16   Ht 1.676 m (5' 6\")   Wt (!) 159 kg (350 lb)   SpO2 95%     Current medications as of today   Current Outpatient Medications   Medication Sig Dispense Refill    lisinopril (PRINIVIL) 5 MG Tab Take 1 Tablet by mouth every day. 90 Tablet 1    therapeutic multivitamin-minerals (THERAGRAN-M) Tab Take 1 Tablet by mouth every day. 30 Tablet 0    Cholecalciferol (VITAMIN D) 50 MCG (2000 UT) Cap Take 1 Capsule by mouth every day. 30 Capsule 0    b complex vitamins capsule Take 1 Capsule by mouth every day. 30 Capsule 0    Ashwagandha 125 MG Cap Take 1 Capsule by mouth every day. 30 Capsule 0     No current facility-administered medications for this visit.         Physical Exam:   Gen:           Alert and oriented, No apparent distress. Mood and affect appropriate, normal interaction with examiner.  Eyes:          PERRL, EOM intact, sclere white, conjunctive moist.  Ears:          Not examined.   Hearing:     Grossly intact.  Nose:   "        Normal, no lesions or deformities.  Dentition:    Good dentition.  Oropharynx:   Tongue normal, posterior pharynx without erythema or exudate.  Neck:        Supple, trachea midline, no masses.  Respiratory Effort: No intercostal retractions or use of accessory muscles.   Lung Auscultation:      Clear to auscultation bilaterally; no rales, rhonchi or wheezing.  CV:            Regular rate and rhythm. No murmurs, rubs or gallops.  Abd:           Not examined.   Lymphadenopathy: Not examined.  Gait and Station: Normal.  Digits and Nails: No clubbing, cyanosis, petechiae, or nodes.   Cranial Nerves: II-XII grossly intact.  Skin:        No rashes, lesions or ulcers noted.               Ext:           No cyanosis or edema.      Assessment:  1. MOHSEN (obstructive sleep apnea)  DME CPAP        Plan:   I reviewed with the patient the pathophysiology of obstructive sleep apnea, as well as potential cardiac and neurologic risks associated with untreated sleep apnea including CAD, HTN, pulmonary arterial hypertension, cardiac arrhythmias, heart attack or stroke.  MOHSEN patient's have increased risk of motor vehicle accidents, DM type II, chronic kidney disease and nonalcoholic liver disease.  He is cautioned against driving while sleepy for his safety and safety of others on the road. We reviewed treatment modalities for sleep apnea including CPAP/BiPAP therapy, ENT referral, dental appliance.      Study showed evidence of severe MOHSEN.  Patient is amendable to trial on PAP therapy.  Placed order for auto CPAP 5 to 20 cm/H2O.  Discussed compliance requirements and recommendations to follow-up within 90 days.      DME : [facility]    Once you receive your new PAP machine from the [employer] company you're referred to, we must see you back for an office visit between 30-90 days of you using the machine to review compliance.  If you were ordered an ASV machine, we need to see you in office no sooner than your 60th  "day on therapy.     This is a very important time frame for insurance purposes. If you do not follow up with our office for compliance your insurance may not continue to pay for the machine. Also if you do not use the machine for at least 4 hours each night, you may be deemed \"Incompliant\", in which case the insurance may also not continue to pay for the machine.    If you are incompliant, you may have to surrender your machine to the DME company and start the process over if you wish to continue therapy after that. Meaning a new office consult and new sleep studies.    For your first visit back with our office, please bring the whole machine with the power cord. We will download the compliance off the SD card in the machine, and are able to make changes if need be in office. Some machines have a modem and we can access the data wirelessly, if this is the case please make sure the DME company grants us access to your machine.  For all follow up appointments after that, you will only need to bring the SD card to the appointment.    If you are having any issues with the mask, you have a 30 day window to exchange and try something else with your DME company.  If you are having issues with the pressure, please call our office at [phone].  These issues can cause you to not be able to use machine appropriately, there for make you incompliant.    Please call our office if you have any questions.    Thank you, [facility].  [phone]      Please note that this dictation was created using voice recognition software. I have made every reasonable attempt to correct obvious errors, but it is possible there are errors of grammar and possibly content that I did not discover before finalizing the note.    "

## 2024-09-05 ENCOUNTER — OFFICE VISIT (OUTPATIENT)
Dept: SLEEP MEDICINE | Facility: MEDICAL CENTER | Age: 34
End: 2024-09-05
Attending: NURSE PRACTITIONER
Payer: COMMERCIAL

## 2024-09-05 VITALS
DIASTOLIC BLOOD PRESSURE: 66 MMHG | SYSTOLIC BLOOD PRESSURE: 118 MMHG | WEIGHT: 315 LBS | HEIGHT: 66 IN | RESPIRATION RATE: 16 BRPM | BODY MASS INDEX: 50.62 KG/M2 | OXYGEN SATURATION: 95 % | HEART RATE: 96 BPM

## 2024-09-05 DIAGNOSIS — G47.33 OSA (OBSTRUCTIVE SLEEP APNEA): ICD-10-CM

## 2024-09-05 PROCEDURE — 99214 OFFICE O/P EST MOD 30 MIN: CPT | Performed by: NURSE PRACTITIONER

## 2024-09-05 PROCEDURE — 3074F SYST BP LT 130 MM HG: CPT | Performed by: NURSE PRACTITIONER

## 2024-09-05 PROCEDURE — 3078F DIAST BP <80 MM HG: CPT | Performed by: NURSE PRACTITIONER

## 2024-09-05 PROCEDURE — 99213 OFFICE O/P EST LOW 20 MIN: CPT | Performed by: NURSE PRACTITIONER

## 2024-09-05 ASSESSMENT — FIBROSIS 4 INDEX: FIB4 SCORE: 0.43

## 2024-09-05 NOTE — PROGRESS NOTES
Chief Complaint   Patient presents with    Follow-Up     SLEEP - ESTABLISHED PT CHART PREP COMPLETED ON 08/29/2024     Last Office Visit 06/28/2024 with Geoffrey Montoya    1st Compliance: Yes    DME: [facility]    Settings: auto CPAP 5 to 20 cm/H2O    Wireless Data? NO, If not wireless contact pt to bring in device.     OAT ? : N/A , DDS who provided OAT? N/A         HPI:  Ramakrihsna Menezes Jr. is a 34 y.o. year old male here today for follow-up on today with first compliance on CPAP.  Last seen 6/28/2024 by me.  Home sleep study showed evidence of severe MOHSEN with an AHI of 84.3 and O2 ravin of 75% with oxygen less than or equal to 88% for 52 minutes.  An order was placed for auto CPAP.  Patient presents today for follow-up.  Initial presenting symptoms included severe daytime sleepiness, fragmented sleep, loud snoring, apneas, and frequent morning headaches..     Patient was set up approximately 1 to 2 months ago from [facility].  Initially started using nasal pillows, but switched to fullface mask due to nasal irritation and congestion.  Overall notes improvement in nasal congestion since switching mask.  Denies any difficulty with mask fit or pressures at this time.  Averaging 7 hours of sleep and denies any difficulty falling or staying asleep.  Overall notes improvement in sleep quality and improvement in energy level since starting on CPAP.  Denies any excessive daytime sleepiness, morning headaches, palpitations, concentration or memory problems.  Since starting on CPAP, has stopped working 3 jobs and is currently only working 1.  Does note improvement in energy levels and fatigue because of this also.    30-day compliance shows 97% use with an average time of 5 hours and 30 minutes and a residual AHI of 1.4 with no evidence of persistent mask leak.      ROS: As per HPI and otherwise negative if not stated.    Past Medical History:   Diagnosis Date    Apnea, sleep     Daytime sleepiness     Dizziness     Earache   "   Fatigue     Frequent headaches     Frequent urination     Gasping for breath     Heartburn     IFG (impaired fasting glucose) 02/08/2024    Morbid obesity (HCC) 01/23/2024    Morning headache     Painful joint     Peritonsillar abscess 02/08/2024    Plantar fasciitis, bilateral 02/08/2024    Primary hypertension 02/08/2024    Ringing in ears     Snoring 02/08/2024    Swelling of lower extremity     Vitamin D deficiency 02/08/2024    Weakness        No past surgical history on file.    Family History   Problem Relation Age of Onset    Heart Disease Mother 50    Hyperlipidemia Father     Hypertension Father     Heart Disease Father     Diabetes Father     Diabetes Brother 20    Hyperlipidemia Brother     Hypertension Brother     Heart Disease Brother     Diabetes Maternal Aunt     Diabetes Paternal Aunt        Allergies as of 09/05/2024    (No Known Allergies)        Vitals:  /66 (BP Location: Left arm, Patient Position: Sitting, BP Cuff Size: Adult)   Pulse 96   Resp 16   Ht 1.676 m (5' 6\")   Wt (!) 167 kg (369 lb)   SpO2 95%     Current medications as of today   Current Outpatient Medications   Medication Sig Dispense Refill    lisinopril (PRINIVIL) 5 MG Tab Take 1 Tablet by mouth every day. 90 Tablet 1    therapeutic multivitamin-minerals (THERAGRAN-M) Tab Take 1 Tablet by mouth every day. 30 Tablet 0    Cholecalciferol (VITAMIN D) 50 MCG (2000 UT) Cap Take 1 Capsule by mouth every day. 30 Capsule 0    b complex vitamins capsule Take 1 Capsule by mouth every day. 30 Capsule 0    Ashwagandha 125 MG Cap Take 1 Capsule by mouth every day. 30 Capsule 0     No current facility-administered medications for this visit.         Physical Exam:   Gen:           Alert and oriented, No apparent distress. Mood and affect appropriate, normal interaction with examiner.  Eyes:          PERRL, EOM intact, sclere white, conjunctive moist.  Ears:          Not examined.   Hearing:     Grossly intact.  Nose:        "   Normal, no lesions or deformities.  Dentition:    Good dentition.  Oropharynx:   Tongue normal, posterior pharynx without erythema or exudate.  Neck:        Supple, trachea midline, no masses.  Respiratory Effort: No intercostal retractions or use of accessory muscles.   Lung Auscultation:      Clear to auscultation bilaterally; no rales, rhonchi or wheezing.  CV:            Regular rate and rhythm. No murmurs, rubs or gallops.  Abd:           Not examined.   Lymphadenopathy: Not examined.  Gait and Station: Normal.  Digits and Nails: No clubbing, cyanosis, petechiae, or nodes.   Cranial Nerves: II-XII grossly intact.  Skin:        No rashes, lesions or ulcers noted.               Ext:           No cyanosis or edema.      Assessment:  1. MOHSEN (obstructive sleep apnea)          Plan:  Patient is using and benefiting from CPAP therapy.  Compliance on device shows adequate use and control of MOHSEN.  Order placed for mask and supplies.  Patient to follow-up within 9 months for annual MOHSEN, but can be seen sooner if needed.    Please note that this dictation was created using voice recognition software. I have made every reasonable attempt to correct obvious errors, but it is possible there are errors of grammar and possibly content that I did not discover before finalizing the note.

## 2024-09-12 DIAGNOSIS — I10 HYPERTENSION, UNSPECIFIED TYPE: ICD-10-CM

## 2024-09-13 NOTE — TELEPHONE ENCOUNTER
Received request via: Pharmacy    Was the patient seen in the last year in this department? Yes    Does the patient have an active prescription (recently filled or refills available) for medication(s) requested? No    Pharmacy Name:     [facility] [place] [phone]       Does the patient have jail Plus and need 100-day supply? (This applies to ALL medications) Patient does not have SCP

## 2024-09-15 PROCEDURE — RXMED WILLOW AMBULATORY MEDICATION CHARGE: Performed by: NURSE PRACTITIONER

## 2024-09-15 RX ORDER — LISINOPRIL 5 MG/1
5 TABLET ORAL DAILY
Qty: 30 TABLET | Refills: 0 | Status: SHIPPED | OUTPATIENT
Start: 2024-09-15

## 2024-09-22 ENCOUNTER — PHARMACY VISIT (OUTPATIENT)
Dept: PHARMACY | Facility: MEDICAL CENTER | Age: 34
End: 2024-09-22
Payer: COMMERCIAL

## 2024-10-03 ENCOUNTER — APPOINTMENT (OUTPATIENT)
Dept: PHARMACY | Facility: MEDICAL CENTER | Age: 34
End: 2024-10-03
Payer: COMMERCIAL

## 2024-10-07 ENCOUNTER — APPOINTMENT (OUTPATIENT)
Dept: MEDICAL GROUP | Facility: MEDICAL CENTER | Age: 34
End: 2024-10-07
Payer: COMMERCIAL

## 2024-10-10 ENCOUNTER — APPOINTMENT (OUTPATIENT)
Dept: PHARMACY | Facility: MEDICAL CENTER | Age: 34
End: 2024-10-10
Payer: COMMERCIAL

## 2024-10-10 ENCOUNTER — HOSPITAL ENCOUNTER (OUTPATIENT)
Dept: LAB | Facility: MEDICAL CENTER | Age: 34
End: 2024-10-10
Attending: NURSE PRACTITIONER
Payer: COMMERCIAL

## 2024-10-10 DIAGNOSIS — E55.9 VITAMIN D DEFICIENCY: ICD-10-CM

## 2024-10-10 DIAGNOSIS — R73.01 IFG (IMPAIRED FASTING GLUCOSE): ICD-10-CM

## 2024-10-10 DIAGNOSIS — E78.5 HYPERLIPIDEMIA LDL GOAL <100: ICD-10-CM

## 2024-10-10 DIAGNOSIS — I10 HYPERTENSION, UNSPECIFIED TYPE: ICD-10-CM

## 2024-10-10 LAB
25(OH)D3 SERPL-MCNC: 36 NG/ML (ref 30–100)
ALBUMIN SERPL BCP-MCNC: 4.4 G/DL (ref 3.2–4.9)
ALBUMIN/GLOB SERPL: 1.5 G/DL
ALP SERPL-CCNC: 64 U/L (ref 30–99)
ALT SERPL-CCNC: 22 U/L (ref 2–50)
ANION GAP SERPL CALC-SCNC: 14 MMOL/L (ref 7–16)
AST SERPL-CCNC: 21 U/L (ref 12–45)
BILIRUB SERPL-MCNC: 0.5 MG/DL (ref 0.1–1.5)
BUN SERPL-MCNC: 11 MG/DL (ref 8–22)
CALCIUM ALBUM COR SERPL-MCNC: 9.2 MG/DL (ref 8.5–10.5)
CALCIUM SERPL-MCNC: 9.5 MG/DL (ref 8.5–10.5)
CHLORIDE SERPL-SCNC: 104 MMOL/L (ref 96–112)
CHOLEST SERPL-MCNC: 182 MG/DL (ref 100–199)
CO2 SERPL-SCNC: 22 MMOL/L (ref 20–33)
CREAT SERPL-MCNC: 0.96 MG/DL (ref 0.5–1.4)
EST. AVERAGE GLUCOSE BLD GHB EST-MCNC: 128 MG/DL
FASTING STATUS PATIENT QL REPORTED: NORMAL
GFR SERPLBLD CREATININE-BSD FMLA CKD-EPI: 106 ML/MIN/1.73 M 2
GLOBULIN SER CALC-MCNC: 2.9 G/DL (ref 1.9–3.5)
GLUCOSE SERPL-MCNC: 98 MG/DL (ref 65–99)
HBA1C MFR BLD: 6.1 % (ref 4–5.6)
HDLC SERPL-MCNC: 36 MG/DL
LDLC SERPL CALC-MCNC: 135 MG/DL
POTASSIUM SERPL-SCNC: 4.4 MMOL/L (ref 3.6–5.5)
PROT SERPL-MCNC: 7.3 G/DL (ref 6–8.2)
SODIUM SERPL-SCNC: 140 MMOL/L (ref 135–145)
TRIGL SERPL-MCNC: 56 MG/DL (ref 0–149)

## 2024-10-10 PROCEDURE — 82043 UR ALBUMIN QUANTITATIVE: CPT

## 2024-10-10 PROCEDURE — 82306 VITAMIN D 25 HYDROXY: CPT

## 2024-10-10 PROCEDURE — 80061 LIPID PANEL: CPT

## 2024-10-10 PROCEDURE — 82570 ASSAY OF URINE CREATININE: CPT

## 2024-10-10 PROCEDURE — 83036 HEMOGLOBIN GLYCOSYLATED A1C: CPT

## 2024-10-10 PROCEDURE — 80053 COMPREHEN METABOLIC PANEL: CPT

## 2024-10-10 PROCEDURE — 36415 COLL VENOUS BLD VENIPUNCTURE: CPT

## 2024-10-11 ENCOUNTER — APPOINTMENT (OUTPATIENT)
Dept: PHARMACY | Facility: MEDICAL CENTER | Age: 34
End: 2024-10-11
Payer: COMMERCIAL

## 2024-10-11 LAB
CREAT UR-MCNC: 207 MG/DL
MICROALBUMIN UR-MCNC: 3.4 MG/DL
MICROALBUMIN/CREAT UR: 16 MG/G (ref 0–30)

## 2024-10-14 ENCOUNTER — APPOINTMENT (OUTPATIENT)
Dept: PHARMACY | Facility: MEDICAL CENTER | Age: 34
End: 2024-10-14
Payer: COMMERCIAL

## 2024-10-17 ENCOUNTER — APPOINTMENT (OUTPATIENT)
Dept: MEDICAL GROUP | Facility: MEDICAL CENTER | Age: 34
End: 2024-10-17
Payer: COMMERCIAL

## 2024-10-17 VITALS
TEMPERATURE: 97.4 F | HEART RATE: 97 BPM | WEIGHT: 315 LBS | BODY MASS INDEX: 50.62 KG/M2 | DIASTOLIC BLOOD PRESSURE: 68 MMHG | SYSTOLIC BLOOD PRESSURE: 118 MMHG | HEIGHT: 66 IN | OXYGEN SATURATION: 97 %

## 2024-10-17 DIAGNOSIS — I10 HYPERTENSION, UNSPECIFIED TYPE: ICD-10-CM

## 2024-10-17 DIAGNOSIS — E55.9 VITAMIN D DEFICIENCY: ICD-10-CM

## 2024-10-17 DIAGNOSIS — E78.5 HYPERLIPIDEMIA LDL GOAL <100: ICD-10-CM

## 2024-10-17 DIAGNOSIS — R73.01 IFG (IMPAIRED FASTING GLUCOSE): ICD-10-CM

## 2024-10-17 PROCEDURE — 3074F SYST BP LT 130 MM HG: CPT | Performed by: NURSE PRACTITIONER

## 2024-10-17 PROCEDURE — RXMED WILLOW AMBULATORY MEDICATION CHARGE: Performed by: NURSE PRACTITIONER

## 2024-10-17 PROCEDURE — 3078F DIAST BP <80 MM HG: CPT | Performed by: NURSE PRACTITIONER

## 2024-10-17 PROCEDURE — 99214 OFFICE O/P EST MOD 30 MIN: CPT | Performed by: NURSE PRACTITIONER

## 2024-10-17 RX ORDER — LISINOPRIL 5 MG/1
5 TABLET ORAL DAILY
Qty: 90 TABLET | Refills: 0 | Status: SHIPPED | OUTPATIENT
Start: 2024-10-17

## 2024-10-17 RX ORDER — ATORVASTATIN CALCIUM 10 MG/1
10 TABLET, FILM COATED ORAL
Qty: 14 TABLET | Refills: 3 | Status: SHIPPED | OUTPATIENT
Start: 2024-10-17

## 2024-10-17 ASSESSMENT — FIBROSIS 4 INDEX: FIB4 SCORE: 0.54

## 2024-10-22 ENCOUNTER — PHARMACY VISIT (OUTPATIENT)
Dept: PHARMACY | Facility: MEDICAL CENTER | Age: 34
End: 2024-10-22
Payer: COMMERCIAL

## 2024-11-23 ENCOUNTER — PHARMACY VISIT (OUTPATIENT)
Dept: PHARMACY | Facility: MEDICAL CENTER | Age: 34
End: 2024-11-23
Payer: COMMERCIAL

## 2024-11-23 PROCEDURE — RXMED WILLOW AMBULATORY MEDICATION CHARGE: Performed by: NURSE PRACTITIONER

## 2024-12-29 DIAGNOSIS — I10 HYPERTENSION, UNSPECIFIED TYPE: ICD-10-CM

## 2024-12-29 PROCEDURE — RXMED WILLOW AMBULATORY MEDICATION CHARGE: Performed by: NURSE PRACTITIONER

## 2024-12-29 RX ORDER — LISINOPRIL 5 MG/1
5 TABLET ORAL DAILY
Qty: 90 TABLET | Refills: 0 | Status: SHIPPED | OUTPATIENT
Start: 2024-12-29

## 2024-12-30 ENCOUNTER — PHARMACY VISIT (OUTPATIENT)
Dept: PHARMACY | Facility: MEDICAL CENTER | Age: 34
End: 2024-12-30
Payer: COMMERCIAL

## 2025-01-16 ENCOUNTER — PHARMACY VISIT (OUTPATIENT)
Dept: PHARMACY | Facility: MEDICAL CENTER | Age: 35
End: 2025-01-16
Payer: COMMERCIAL

## 2025-01-16 PROCEDURE — RXOTC WILLOW AMBULATORY OTC CHARGE

## 2025-01-16 PROCEDURE — RXMED WILLOW AMBULATORY MEDICATION CHARGE: Performed by: STUDENT IN AN ORGANIZED HEALTH CARE EDUCATION/TRAINING PROGRAM

## 2025-02-13 ENCOUNTER — HOSPITAL ENCOUNTER (OUTPATIENT)
Facility: MEDICAL CENTER | Age: 35
End: 2025-02-13
Attending: EMERGENCY MEDICINE | Admitting: INTERNAL MEDICINE

## 2025-02-13 ENCOUNTER — APPOINTMENT (OUTPATIENT)
Dept: RADIOLOGY | Facility: MEDICAL CENTER | Age: 35
End: 2025-02-13
Attending: EMERGENCY MEDICINE

## 2025-02-13 ENCOUNTER — APPOINTMENT (OUTPATIENT)
Dept: RADIOLOGY | Facility: MEDICAL CENTER | Age: 35
DRG: 194 | End: 2025-02-13
Attending: STUDENT IN AN ORGANIZED HEALTH CARE EDUCATION/TRAINING PROGRAM

## 2025-02-13 ENCOUNTER — HOSPITAL ENCOUNTER (INPATIENT)
Facility: MEDICAL CENTER | Age: 35
LOS: 1 days | DRG: 194 | End: 2025-02-14
Attending: STUDENT IN AN ORGANIZED HEALTH CARE EDUCATION/TRAINING PROGRAM | Admitting: STUDENT IN AN ORGANIZED HEALTH CARE EDUCATION/TRAINING PROGRAM

## 2025-02-13 VITALS
TEMPERATURE: 96.7 F | HEIGHT: 66 IN | OXYGEN SATURATION: 93 % | SYSTOLIC BLOOD PRESSURE: 117 MMHG | RESPIRATION RATE: 12 BRPM | DIASTOLIC BLOOD PRESSURE: 56 MMHG | HEART RATE: 100 BPM | WEIGHT: 315 LBS | BODY MASS INDEX: 50.62 KG/M2

## 2025-02-13 DIAGNOSIS — R00.0 TACHYCARDIA: ICD-10-CM

## 2025-02-13 DIAGNOSIS — J18.9 ACUTE PNEUMONIA: ICD-10-CM

## 2025-02-13 DIAGNOSIS — R06.00 DYSPNEA, UNSPECIFIED TYPE: ICD-10-CM

## 2025-02-13 DIAGNOSIS — R06.02 SHORTNESS OF BREATH: ICD-10-CM

## 2025-02-13 DIAGNOSIS — R50.9 FEVER, UNSPECIFIED FEVER CAUSE: ICD-10-CM

## 2025-02-13 PROBLEM — R65.10 SIRS (SYSTEMIC INFLAMMATORY RESPONSE SYNDROME) (HCC): Status: ACTIVE | Noted: 2025-02-13

## 2025-02-13 PROBLEM — E87.20 METABOLIC ACIDOSIS: Status: ACTIVE | Noted: 2025-02-13

## 2025-02-13 PROBLEM — E83.42 HYPOMAGNESEMIA: Status: ACTIVE | Noted: 2025-02-13

## 2025-02-13 LAB
ALBUMIN SERPL BCP-MCNC: 3.9 G/DL (ref 3.2–4.9)
ALBUMIN/GLOB SERPL: 1.3 G/DL
ALP SERPL-CCNC: 68 U/L (ref 30–99)
ALT SERPL-CCNC: 30 U/L (ref 2–50)
AMPHET UR QL SCN: NEGATIVE
ANION GAP SERPL CALC-SCNC: 13 MMOL/L (ref 7–16)
APPEARANCE UR: CLEAR
AST SERPL-CCNC: 26 U/L (ref 12–45)
BACTERIA #/AREA URNS HPF: ABNORMAL /HPF
BARBITURATES UR QL SCN: NEGATIVE
BASOPHILS # BLD AUTO: 0.3 % (ref 0–1.8)
BASOPHILS # BLD: 0.03 K/UL (ref 0–0.12)
BENZODIAZ UR QL SCN: NEGATIVE
BILIRUB SERPL-MCNC: 0.5 MG/DL (ref 0.1–1.5)
BILIRUB UR QL STRIP.AUTO: NEGATIVE
BUN SERPL-MCNC: 18 MG/DL (ref 8–22)
BZE UR QL SCN: NEGATIVE
CALCIUM ALBUM COR SERPL-MCNC: 9.1 MG/DL (ref 8.5–10.5)
CALCIUM SERPL-MCNC: 9 MG/DL (ref 8.5–10.5)
CANNABINOIDS UR QL SCN: NEGATIVE
CASTS URNS QL MICRO: ABNORMAL /LPF (ref 0–2)
CHLORIDE SERPL-SCNC: 107 MMOL/L (ref 96–112)
CO2 SERPL-SCNC: 17 MMOL/L (ref 20–33)
COLOR UR: YELLOW
CREAT SERPL-MCNC: 1.37 MG/DL (ref 0.5–1.4)
D DIMER PPP IA.FEU-MCNC: 2.12 UG/ML (FEU) (ref 0–0.5)
EKG IMPRESSION: NORMAL
EKG IMPRESSION: NORMAL
EOSINOPHIL # BLD AUTO: 0 K/UL (ref 0–0.51)
EOSINOPHIL NFR BLD: 0 % (ref 0–6.9)
EPITHELIAL CELLS 1715: ABNORMAL /HPF (ref 0–5)
ERYTHROCYTE [DISTWIDTH] IN BLOOD BY AUTOMATED COUNT: 41.1 FL (ref 35.9–50)
FENTANYL UR QL: POSITIVE
FLUAV RNA SPEC QL NAA+PROBE: NEGATIVE
FLUBV RNA SPEC QL NAA+PROBE: NEGATIVE
GFR SERPLBLD CREATININE-BSD FMLA CKD-EPI: 69 ML/MIN/1.73 M 2
GLOBULIN SER CALC-MCNC: 3.1 G/DL (ref 1.9–3.5)
GLUCOSE SERPL-MCNC: 104 MG/DL (ref 65–99)
GLUCOSE UR STRIP.AUTO-MCNC: NEGATIVE MG/DL
HAV IGM SERPL QL IA: NORMAL
HBV CORE IGM SER QL: NORMAL
HBV SURFACE AG SER QL: NORMAL
HCT VFR BLD AUTO: 44.8 % (ref 42–52)
HCV AB SER QL: NORMAL
HGB BLD-MCNC: 15 G/DL (ref 14–18)
IMM GRANULOCYTES # BLD AUTO: 0.06 K/UL (ref 0–0.11)
IMM GRANULOCYTES NFR BLD AUTO: 0.6 % (ref 0–0.9)
KETONES UR STRIP.AUTO-MCNC: 15 MG/DL
LACTATE SERPL-SCNC: 1.7 MMOL/L (ref 0.5–2)
LEUKOCYTE ESTERASE UR QL STRIP.AUTO: ABNORMAL
LIPASE SERPL-CCNC: 9 U/L (ref 11–82)
LYMPHOCYTES # BLD AUTO: 0.55 K/UL (ref 1–4.8)
LYMPHOCYTES NFR BLD: 5.1 % (ref 22–41)
MAGNESIUM SERPL-MCNC: 1.4 MG/DL (ref 1.5–2.5)
MCH RBC QN AUTO: 29.7 PG (ref 27–33)
MCHC RBC AUTO-ENTMCNC: 33.5 G/DL (ref 32.3–36.5)
MCV RBC AUTO: 88.7 FL (ref 81.4–97.8)
METHADONE UR QL SCN: NEGATIVE
MICRO URNS: ABNORMAL
MONOCYTES # BLD AUTO: 0.1 K/UL (ref 0–0.85)
MONOCYTES NFR BLD AUTO: 0.9 % (ref 0–13.4)
NEUTROPHILS # BLD AUTO: 10.13 K/UL (ref 1.82–7.42)
NEUTROPHILS NFR BLD: 93.1 % (ref 44–72)
NITRITE UR QL STRIP.AUTO: NEGATIVE
NRBC # BLD AUTO: 0 K/UL
NRBC BLD-RTO: 0 /100 WBC (ref 0–0.2)
NT-PROBNP SERPL IA-MCNC: 111 PG/ML (ref 0–125)
OPIATES UR QL SCN: NEGATIVE
OXYCODONE UR QL SCN: NEGATIVE
PCP UR QL SCN: NEGATIVE
PH UR STRIP.AUTO: 6.5 [PH] (ref 5–8)
PHOSPHATE SERPL-MCNC: 3 MG/DL (ref 2.5–4.5)
PLATELET # BLD AUTO: 224 K/UL (ref 164–446)
PMV BLD AUTO: 11.2 FL (ref 9–12.9)
POTASSIUM SERPL-SCNC: 3.8 MMOL/L (ref 3.6–5.5)
PROCALCITONIN SERPL-MCNC: 14 NG/ML
PROPOXYPH UR QL SCN: NEGATIVE
PROT SERPL-MCNC: 7 G/DL (ref 6–8.2)
PROT UR QL STRIP: 30 MG/DL
RBC # BLD AUTO: 5.05 M/UL (ref 4.7–6.1)
RBC # URNS HPF: ABNORMAL /HPF (ref 0–2)
RBC UR QL AUTO: ABNORMAL
RSV RNA SPEC QL NAA+PROBE: NEGATIVE
SARS-COV-2 RNA RESP QL NAA+PROBE: NOTDETECTED
SODIUM SERPL-SCNC: 137 MMOL/L (ref 135–145)
SP GR UR STRIP.AUTO: >1.045
TROPONIN T SERPL-MCNC: 10 NG/L (ref 6–19)
TSH SERPL DL<=0.005 MIU/L-ACNC: 0.67 UIU/ML (ref 0.38–5.33)
TSH SERPL DL<=0.005 MIU/L-ACNC: 0.69 UIU/ML (ref 0.38–5.33)
UROBILINOGEN UR STRIP.AUTO-MCNC: 1 EU/DL
WBC # BLD AUTO: 10.9 K/UL (ref 4.8–10.8)
WBC #/AREA URNS HPF: ABNORMAL /HPF

## 2025-02-13 PROCEDURE — 83690 ASSAY OF LIPASE: CPT

## 2025-02-13 PROCEDURE — 93005 ELECTROCARDIOGRAM TRACING: CPT | Mod: TC

## 2025-02-13 PROCEDURE — 80074 ACUTE HEPATITIS PANEL: CPT

## 2025-02-13 PROCEDURE — 80307 DRUG TEST PRSMV CHEM ANLYZR: CPT

## 2025-02-13 PROCEDURE — G0378 HOSPITAL OBSERVATION PER HR: HCPCS

## 2025-02-13 PROCEDURE — 700102 HCHG RX REV CODE 250 W/ 637 OVERRIDE(OP): Performed by: STUDENT IN AN ORGANIZED HEALTH CARE EDUCATION/TRAINING PROGRAM

## 2025-02-13 PROCEDURE — 81001 URINALYSIS AUTO W/SCOPE: CPT

## 2025-02-13 PROCEDURE — 770020 HCHG ROOM/CARE - TELE (206)

## 2025-02-13 PROCEDURE — 83880 ASSAY OF NATRIURETIC PEPTIDE: CPT

## 2025-02-13 PROCEDURE — 84484 ASSAY OF TROPONIN QUANT: CPT

## 2025-02-13 PROCEDURE — 99999 PR NO CHARGE: CPT | Performed by: STUDENT IN AN ORGANIZED HEALTH CARE EDUCATION/TRAINING PROGRAM

## 2025-02-13 PROCEDURE — 87040 BLOOD CULTURE FOR BACTERIA: CPT | Mod: 91

## 2025-02-13 PROCEDURE — 700117 HCHG RX CONTRAST REV CODE 255: Performed by: STUDENT IN AN ORGANIZED HEALTH CARE EDUCATION/TRAINING PROGRAM

## 2025-02-13 PROCEDURE — 84443 ASSAY THYROID STIM HORMONE: CPT

## 2025-02-13 PROCEDURE — 99223 1ST HOSP IP/OBS HIGH 75: CPT | Performed by: INTERNAL MEDICINE

## 2025-02-13 PROCEDURE — 83605 ASSAY OF LACTIC ACID: CPT

## 2025-02-13 PROCEDURE — 83735 ASSAY OF MAGNESIUM: CPT

## 2025-02-13 PROCEDURE — 94660 CPAP INITIATION&MGMT: CPT

## 2025-02-13 PROCEDURE — 99285 EMERGENCY DEPT VISIT HI MDM: CPT

## 2025-02-13 PROCEDURE — 80053 COMPREHEN METABOLIC PANEL: CPT

## 2025-02-13 PROCEDURE — 700105 HCHG RX REV CODE 258: Performed by: STUDENT IN AN ORGANIZED HEALTH CARE EDUCATION/TRAINING PROGRAM

## 2025-02-13 PROCEDURE — 71045 X-RAY EXAM CHEST 1 VIEW: CPT

## 2025-02-13 PROCEDURE — 0241U HCHG SARS-COV-2 COVID-19 NFCT DS RESP RNA 4 TRGT ED POC: CPT

## 2025-02-13 PROCEDURE — 71275 CT ANGIOGRAPHY CHEST: CPT

## 2025-02-13 PROCEDURE — 93005 ELECTROCARDIOGRAM TRACING: CPT | Mod: TC | Performed by: STUDENT IN AN ORGANIZED HEALTH CARE EDUCATION/TRAINING PROGRAM

## 2025-02-13 PROCEDURE — 74177 CT ABD & PELVIS W/CONTRAST: CPT

## 2025-02-13 PROCEDURE — 700111 HCHG RX REV CODE 636 W/ 250 OVERRIDE (IP): Performed by: STUDENT IN AN ORGANIZED HEALTH CARE EDUCATION/TRAINING PROGRAM

## 2025-02-13 PROCEDURE — 93005 ELECTROCARDIOGRAM TRACING: CPT | Mod: TC | Performed by: EMERGENCY MEDICINE

## 2025-02-13 PROCEDURE — A9270 NON-COVERED ITEM OR SERVICE: HCPCS | Performed by: STUDENT IN AN ORGANIZED HEALTH CARE EDUCATION/TRAINING PROGRAM

## 2025-02-13 PROCEDURE — 36415 COLL VENOUS BLD VENIPUNCTURE: CPT

## 2025-02-13 PROCEDURE — 87086 URINE CULTURE/COLONY COUNT: CPT

## 2025-02-13 PROCEDURE — 84145 PROCALCITONIN (PCT): CPT

## 2025-02-13 PROCEDURE — 84443 ASSAY THYROID STIM HORMONE: CPT | Mod: 91

## 2025-02-13 PROCEDURE — 700111 HCHG RX REV CODE 636 W/ 250 OVERRIDE (IP): Mod: JZ | Performed by: STUDENT IN AN ORGANIZED HEALTH CARE EDUCATION/TRAINING PROGRAM

## 2025-02-13 PROCEDURE — 96365 THER/PROPH/DIAG IV INF INIT: CPT

## 2025-02-13 PROCEDURE — 87641 MR-STAPH DNA AMP PROBE: CPT

## 2025-02-13 PROCEDURE — 86480 TB TEST CELL IMMUN MEASURE: CPT

## 2025-02-13 PROCEDURE — 700117 HCHG RX CONTRAST REV CODE 255: Performed by: EMERGENCY MEDICINE

## 2025-02-13 PROCEDURE — 84100 ASSAY OF PHOSPHORUS: CPT

## 2025-02-13 PROCEDURE — 96375 TX/PRO/DX INJ NEW DRUG ADDON: CPT

## 2025-02-13 PROCEDURE — 85025 COMPLETE CBC W/AUTO DIFF WBC: CPT

## 2025-02-13 PROCEDURE — 85379 FIBRIN DEGRADATION QUANT: CPT

## 2025-02-13 RX ORDER — ONDANSETRON 2 MG/ML
4 INJECTION INTRAMUSCULAR; INTRAVENOUS EVERY 4 HOURS PRN
Status: DISCONTINUED | OUTPATIENT
Start: 2025-02-13 | End: 2025-02-14 | Stop reason: HOSPADM

## 2025-02-13 RX ORDER — CEFTRIAXONE 2 G/1
2000 INJECTION, POWDER, FOR SOLUTION INTRAMUSCULAR; INTRAVENOUS ONCE
Status: COMPLETED | OUTPATIENT
Start: 2025-02-13 | End: 2025-02-13

## 2025-02-13 RX ORDER — PROMETHAZINE HYDROCHLORIDE 25 MG/1
12.5-25 SUPPOSITORY RECTAL EVERY 4 HOURS PRN
Status: DISCONTINUED | OUTPATIENT
Start: 2025-02-13 | End: 2025-02-14 | Stop reason: HOSPADM

## 2025-02-13 RX ORDER — PROMETHAZINE HYDROCHLORIDE 25 MG/1
12.5-25 TABLET ORAL EVERY 4 HOURS PRN
Status: DISCONTINUED | OUTPATIENT
Start: 2025-02-13 | End: 2025-02-14 | Stop reason: HOSPADM

## 2025-02-13 RX ORDER — SODIUM CHLORIDE, SODIUM LACTATE, POTASSIUM CHLORIDE, AND CALCIUM CHLORIDE .6; .31; .03; .02 G/100ML; G/100ML; G/100ML; G/100ML
1000 INJECTION, SOLUTION INTRAVENOUS ONCE
Status: COMPLETED | OUTPATIENT
Start: 2025-02-13 | End: 2025-02-13

## 2025-02-13 RX ORDER — ATORVASTATIN CALCIUM 10 MG/1
10 TABLET, FILM COATED ORAL
Status: DISCONTINUED | OUTPATIENT
Start: 2025-02-13 | End: 2025-02-13

## 2025-02-13 RX ORDER — SODIUM CHLORIDE, SODIUM LACTATE, POTASSIUM CHLORIDE, CALCIUM CHLORIDE 600; 310; 30; 20 MG/100ML; MG/100ML; MG/100ML; MG/100ML
INJECTION, SOLUTION INTRAVENOUS CONTINUOUS
Status: DISCONTINUED | OUTPATIENT
Start: 2025-02-13 | End: 2025-02-14 | Stop reason: HOSPADM

## 2025-02-13 RX ORDER — ENOXAPARIN SODIUM 100 MG/ML
40 INJECTION SUBCUTANEOUS DAILY
Status: DISCONTINUED | OUTPATIENT
Start: 2025-02-13 | End: 2025-02-14 | Stop reason: HOSPADM

## 2025-02-13 RX ORDER — ACETAMINOPHEN 325 MG/1
650 TABLET ORAL EVERY 6 HOURS PRN
Status: DISCONTINUED | OUTPATIENT
Start: 2025-02-13 | End: 2025-02-14 | Stop reason: HOSPADM

## 2025-02-13 RX ORDER — PROCHLORPERAZINE EDISYLATE 5 MG/ML
5-10 INJECTION INTRAMUSCULAR; INTRAVENOUS EVERY 4 HOURS PRN
Status: DISCONTINUED | OUTPATIENT
Start: 2025-02-13 | End: 2025-02-14 | Stop reason: HOSPADM

## 2025-02-13 RX ORDER — MAGNESIUM SULFATE HEPTAHYDRATE 40 MG/ML
4 INJECTION, SOLUTION INTRAVENOUS ONCE
Status: COMPLETED | OUTPATIENT
Start: 2025-02-13 | End: 2025-02-13

## 2025-02-13 RX ORDER — ONDANSETRON 4 MG/1
4 TABLET, ORALLY DISINTEGRATING ORAL EVERY 4 HOURS PRN
Status: DISCONTINUED | OUTPATIENT
Start: 2025-02-13 | End: 2025-02-14 | Stop reason: HOSPADM

## 2025-02-13 RX ORDER — ACETAMINOPHEN 325 MG/1
650 TABLET ORAL ONCE
Status: COMPLETED | OUTPATIENT
Start: 2025-02-13 | End: 2025-02-13

## 2025-02-13 RX ADMIN — IOHEXOL 60 ML: 350 INJECTION, SOLUTION INTRAVENOUS at 08:00

## 2025-02-13 RX ADMIN — VANCOMYCIN HYDROCHLORIDE 3000 MG: 5 INJECTION, POWDER, LYOPHILIZED, FOR SOLUTION INTRAVENOUS at 18:47

## 2025-02-13 RX ADMIN — PIPERACILLIN AND TAZOBACTAM 4.5 G: 4; .5 INJECTION, POWDER, FOR SOLUTION INTRAVENOUS at 20:17

## 2025-02-13 RX ADMIN — SODIUM CHLORIDE, POTASSIUM CHLORIDE, SODIUM LACTATE AND CALCIUM CHLORIDE: 600; 310; 30; 20 INJECTION, SOLUTION INTRAVENOUS at 18:45

## 2025-02-13 RX ADMIN — CEFTRIAXONE SODIUM 2000 MG: 2 INJECTION, POWDER, FOR SOLUTION INTRAMUSCULAR; INTRAVENOUS at 14:45

## 2025-02-13 RX ADMIN — ENOXAPARIN SODIUM 40 MG: 100 INJECTION SUBCUTANEOUS at 18:48

## 2025-02-13 RX ADMIN — ACETAMINOPHEN 650 MG: 325 TABLET ORAL at 14:12

## 2025-02-13 RX ADMIN — PIPERACILLIN AND TAZOBACTAM 4.5 G: 4; .5 INJECTION, POWDER, FOR SOLUTION INTRAVENOUS at 18:18

## 2025-02-13 RX ADMIN — MAGNESIUM SULFATE HEPTAHYDRATE 4 G: 4 INJECTION, SOLUTION INTRAVENOUS at 17:18

## 2025-02-13 RX ADMIN — SODIUM CHLORIDE, POTASSIUM CHLORIDE, SODIUM LACTATE AND CALCIUM CHLORIDE 1000 ML: 600; 310; 30; 20 INJECTION, SOLUTION INTRAVENOUS at 14:46

## 2025-02-13 RX ADMIN — ACETAMINOPHEN 650 MG: 325 TABLET ORAL at 19:45

## 2025-02-13 RX ADMIN — IOHEXOL 100 ML: 350 INJECTION, SOLUTION INTRAVENOUS at 16:33

## 2025-02-13 ASSESSMENT — ENCOUNTER SYMPTOMS
HEADACHES: 0
NAUSEA: 0
FLANK PAIN: 0
VOMITING: 0
COUGH: 1
LOSS OF CONSCIOUSNESS: 0
PALPITATIONS: 1
VOMITING: 0
FEVER: 0
SHORTNESS OF BREATH: 1
FEVER: 1
SPUTUM PRODUCTION: 0
SORE THROAT: 1
STRIDOR: 0
WEAKNESS: 0
MYALGIAS: 0
SHORTNESS OF BREATH: 1
CONSTIPATION: 0
DIARRHEA: 0
NAUSEA: 1
CHILLS: 1
COUGH: 1
ABDOMINAL PAIN: 0
DIZZINESS: 0
FALLS: 0
DEPRESSION: 0
DIARRHEA: 0
TINGLING: 0

## 2025-02-13 ASSESSMENT — FIBROSIS 4 INDEX
FIB4 SCORE: 0.74
FIB4 SCORE: 0.54

## 2025-02-13 ASSESSMENT — PAIN DESCRIPTION - PAIN TYPE
TYPE: ACUTE PAIN
TYPE: ACUTE PAIN

## 2025-02-13 NOTE — ASSESSMENT & PLAN NOTE
CTA was obtained which showed no pulmonary embolism   no sign of significant pulmonary edema  Patient likely has severe pulmonary hypertension due to untreated obstructive sleep apnea  It was recommended patient be admitted for an echocardiogram and potentially Lasix  Viral panel was obtained but was negative  Patient decided to leave AGAINST MEDICAL ADVICE  Patient was alert and oriented, did understand the risk he was taking

## 2025-02-13 NOTE — ASSESSMENT & PLAN NOTE
Patient has been unable to take his Lipitor due to the lack of insurance  He does have a potential new job

## 2025-02-13 NOTE — ED NOTES
Assumed care of pt, received bedside report from ELDA Lara    Cardiac and spO2 monitor on, Pt is on RA, call light within reach.

## 2025-02-13 NOTE — ED NOTES
Med Rec complete per historical   Antibiotics in the past 30 days:yes  Pharmacy patient utilizes:[facility] [place]

## 2025-02-13 NOTE — ED NOTES
Pt is leaving ED AMA states he does not want to be admitted he would rather sleep in his own bed and that he is feeling better, risks and benefits of leaving AMA have been explained to pt by ERP, pt has signed AMA paperwork. Pt is A/O x 4, gait steady, speech clear, denies SI/HI.

## 2025-02-13 NOTE — ED NOTES
Pt is resting comfortably in bed, respirations unlabored, bed rails up x 2   Call light within reach

## 2025-02-13 NOTE — ASSESSMENT & PLAN NOTE
Patient has been off his lisinopril, it is a small dose at 5 mg daily  In the ER, it has been relatively well-controlled  Outpatient follow-up

## 2025-02-13 NOTE — ED TRIAGE NOTES
Chief Complaint   Patient presents with    Shortness of Breath     Pt reports he was here early and was going to be admitted, however left AMA. Pt is orthopneic and tachycardic.       Pt to triage via w/c for above complaint. Presents with orthopneic breathing    Charge RN called  Pt to RED 5    Vitals:    02/13/25 1303   BP: (!) 147/126   Pulse: (!) 131   Resp: (!) 24   Temp: (!) 40.1 °C (104.1 °F)   SpO2: 98%

## 2025-02-13 NOTE — ED NOTES
Med Rec completed per patient  Allergies reviewed    Patient completed a 7 day course of Augmentin a few weeks ago     Patient is not taking anticoagulants     Patient states that he has not taken his Atorvastatin or Lisinopril since December 2024

## 2025-02-13 NOTE — CONSULTS
Hospital Medicine Consultation    Date of Service  2/13/2025    Referring Physician  Delgado Roberson D.O.    Consulting Physician  Delgado Roberson D.O.    Reason for Consultation  Dyspnea and sinus tachycardia    History of Presenting Illness  34 y.o. male who presented 2/13/2025 with dyspnea and palpitations.  Patient states he has noted worsening shortness of breath and palpitations over the last few days.  Patient does have a history of sleep apnea, was using CPAP until he lost his job and reports insurance in the middle of December.  Patient does not have supplies for his BiPAP at this time.  Patient is also not taking his antihypertensives or cholesterol medication.  Patient states he is now sleeping on 3 pillows.  Plan was for admission for echocardiogram as well as assistant with his CPAP supplies however patient decided to leave AGAINST MEDICAL ADVICE.  Viral panel was negative.  I was able to discuss the case with respiratory therapy and got him supplies for his CPAP.  I did discuss the case including labs and imaging with the ER physician.    Review of Systems  Review of Systems   Constitutional:  Positive for chills and malaise/fatigue. Negative for fever.   HENT:  Positive for congestion and sore throat.    Respiratory:  Positive for cough and shortness of breath. Negative for sputum production and stridor.    Cardiovascular:  Positive for palpitations. Negative for chest pain and leg swelling.   Gastrointestinal:  Positive for nausea. Negative for abdominal pain, constipation, diarrhea and vomiting.   Genitourinary:  Negative for dysuria and urgency.   Musculoskeletal:  Negative for falls and myalgias.   Neurological:  Negative for dizziness, tingling, loss of consciousness, weakness and headaches.   Psychiatric/Behavioral:  Negative for depression and suicidal ideas.    All other systems reviewed and are negative.      Past Medical History   has a past medical history of Apnea, sleep, Frequent  headaches, GERD with esophagitis, Hyperlipidemia LDL goal <100 (10/17/2024), IFG (impaired fasting glucose) (02/08/2024), Morbid obesity (HCC) (01/23/2024), Peritonsillar abscess (02/08/2024), Plantar fasciitis, bilateral (02/08/2024), Primary hypertension (02/08/2024), Ringing in ears, and Vitamin D deficiency (02/08/2024).    Surgical History   has no past surgical history on file.    Family History  family history includes Diabetes in his father, maternal aunt, and paternal aunt; Diabetes (age of onset: 20) in his brother; Heart Disease in his brother and father; Heart Disease (age of onset: 50) in his mother; Hyperlipidemia in his brother and father; Hypertension in his brother and father.    Social History   reports that he has never smoked. He has never been exposed to tobacco smoke. He has never used smokeless tobacco. He reports that he does not currently use alcohol. He reports that he does not use drugs.    Medications  Prior to Admission Medications   Prescriptions Last Dose Informant Patient Reported? Taking?   amoxicillin-clavulanate (AUGMENTIN) 875-125 MG Tab 1/23/2025 Patient No No   Sig: Take 1 tablet by mouth every 12 hours for 7 days   Patient taking differently: PRESCRIPTION COURSE WAS COMPLETED   atorvastatin (LIPITOR) 10 MG Tab Not Taking Patient No No   Sig: Take 1 Tablet by mouth every 48 hours.   Patient not taking: Reported on 2/13/2025   lisinopril (PRINIVIL) 5 MG Tab Not Taking Patient No No   Sig: Take 1 Tablet by mouth every day.   Patient not taking: Reported on 2/13/2025      Facility-Administered Medications: None       Allergies  No Known Allergies    Physical Exam  Temp:  [35.9 °C (96.7 °F)] 35.9 °C (96.7 °F)  Pulse:  [105-129] 105  Resp:  [10-22] 20  BP: ()/(49-54) 117/54  SpO2:  [90 %-94 %] 90 %    Physical Exam  Vitals and nursing note reviewed.   Constitutional:       General: He is not in acute distress.     Appearance: He is well-developed. He is obese. He is not  toxic-appearing or diaphoretic.   HENT:      Head: Normocephalic and atraumatic.      Right Ear: External ear normal.      Left Ear: External ear normal.      Nose: Nose normal. No congestion or rhinorrhea.      Mouth/Throat:      Mouth: Mucous membranes are moist.      Pharynx: No oropharyngeal exudate.   Eyes:      General:         Right eye: No discharge.         Left eye: No discharge.   Neck:      Trachea: No tracheal deviation.   Cardiovascular:      Rate and Rhythm: Normal rate and regular rhythm.      Heart sounds: No murmur heard.     No friction rub. No gallop.   Pulmonary:      Effort: Pulmonary effort is normal. No respiratory distress.      Breath sounds: Normal breath sounds. No stridor. No wheezing or rales.   Chest:      Chest wall: No tenderness.   Abdominal:      General: Bowel sounds are normal. There is no distension.      Palpations: Abdomen is soft.      Tenderness: There is no abdominal tenderness.   Musculoskeletal:         General: No tenderness. Normal range of motion.      Cervical back: Normal range of motion and neck supple. No edema or erythema.      Right lower leg: No edema.      Left lower leg: No edema.   Lymphadenopathy:      Cervical: No cervical adenopathy.   Skin:     General: Skin is warm and dry.      Findings: No erythema or rash.   Neurological:      Mental Status: He is alert and oriented to person, place, and time.      Cranial Nerves: No cranial nerve deficit.   Psychiatric:         Mood and Affect: Mood normal.         Behavior: Behavior normal.         Thought Content: Thought content normal.         Judgment: Judgment normal.         Fluids      Laboratory  Recent Labs     02/13/25  0611   WBC 10.9*   RBC 5.05   HEMOGLOBIN 15.0   HEMATOCRIT 44.8   MCV 88.7   MCH 29.7   MCHC 33.5   RDW 41.1   PLATELETCT 224   MPV 11.2     Recent Labs     02/13/25  0611   SODIUM 137   POTASSIUM 3.8   CHLORIDE 107   CO2 17*   GLUCOSE 104*   BUN 18   CREATININE 1.37   CALCIUM 9.0                      Imaging  CT-CTA CHEST PULMONARY ARTERY W/ RECONS   Final Result         1.  No pulmonary embolus appreciated.   2.  Hepatomegaly   3.  Irregular hepatic contour favoring changes of cirrhosis      DX-CHEST-PORTABLE (1 VIEW)   Final Result         1.  No acute cardiopulmonary disease.          Assessment/Plan  * Dyspnea- (present on admission)  Assessment & Plan  CTA was obtained which showed no pulmonary embolism   no sign of significant pulmonary edema  Patient likely has severe pulmonary hypertension due to untreated obstructive sleep apnea  It was recommended patient be admitted for an echocardiogram and potentially Lasix  Viral panel was obtained but was negative  Patient decided to leave AGAINST MEDICAL ADVICE  Patient was alert and oriented, did understand the risk he was taking      Metabolic acidosis- (present on admission)  Assessment & Plan  Mild, potentially respiratory in nature considering body habitus and obstructive sleep apnea    Sinus tachycardia- (present on admission)  Assessment & Plan  Persists, at times into the 130s, during my exam it was greater than 100  Remains sinus  Viral panel negative  Ideally, would obtain echocardiogram but patient is leaving AGAINST MEDICAL ADVICE    Obstructive sleep apnea- (present on admission)  Assessment & Plan  Due to morbid obesity with a BMI of 59  Patient has had this diagnosis, is now noncompliant with CPAP after losing his job and therefore his insurance and unable to get new supplies  I did discuss the case with case management to see if we can assist, they recommended discussing with respiratory therapy which I did, they were able to look up his machine and get him some supplies that will hopefully he will be able to use until he gets a new job  Patient would benefit from outpatient echocardiogram since he is leaving AGAINST MEDICAL ADVICE    Hyperlipidemia LDL goal <100- (present on admission)  Assessment & Plan  Patient has been unable to take  his Lipitor due to the lack of insurance  He does have a potential new job    Primary hypertension- (present on admission)  Assessment & Plan  Patient has been off his lisinopril, it is a small dose at 5 mg daily  In the ER, it has been relatively well-controlled  Outpatient follow-up    Morbid obesity (HCC)- (present on admission)  Assessment & Plan  Body mass index is 59.07 kg/m².  Patient would benefit from diet and exercise adjustment when able      Time spent on consultation including chart, lab and medication review, discussion with ERP, patient, bedside RN, case management and respiratory therapy as well as physical exam took greater than 55 minutes

## 2025-02-13 NOTE — ED PROVIDER NOTES
ED Provider Note    CHIEF COMPLAINT  Chief Complaint   Patient presents with    Abdominal Pain     Pt reports abd pain LUQ since yesterday around 1630. Pt endorses n/v, denies diarrhea. Pt also endorses dysuria.    Shortness of Breath     Pt endorses SOB x3 hrs, Pt 90% on RA with EMS, place don 2L NC up to 95%.        EXTERNAL RECORDS REVIEWED  Outpatient Notes note 10/17/2024, patient with longstanding history of hypertension, was prescribed lisinopril and atorvastatin.  Notes 9/5/2004, patient seen by sleep clinic, diagnosed with sleep apnea    HPI/ROS      Ramakrishna Menezes Jr. is a 34 y.o. male who presents with chief complaint of shortness of breath.  Patient reports shortness of breath for the last few months, progressively worsening over the last few days.  Patient states that he is now at a point where he gets short of breath after very short bouts of ambulation.  He also reports some orthopnea, sleeping with 3 pillows behind his head.  He lost his job a few months ago and therefore is not taking his high blood pressure medicine or his cholesterol medicine.  His CPAP machine broke and he does not have the pieces to fix it.  Patient denies any significant lower extremity edema.  He denies any history of blood clots or PEs.  He started working as a  recently.  Patient denies any associated chest pain but does report some mild left upper quadrant abdominal pain.  This is minimal.  Patient denies any associated changes in bowel movements but does have some mild nausea vomiting.  Emesis is nonbloody nonbilious.  He reports his urine feels warm but denies reed dysuria urgency or frequency.  Patient reports he had some left upper quadrant pain earlier this morning but this is since resolved.    PAST MEDICAL HISTORY   has a past medical history of Apnea, sleep, Frequent headaches, GERD with esophagitis, Hyperlipidemia LDL goal <100 (10/17/2024), IFG (impaired fasting glucose) (02/08/2024), Morbid obesity  "(HCC) (01/23/2024), Peritonsillar abscess (02/08/2024), Plantar fasciitis, bilateral (02/08/2024), Primary hypertension (02/08/2024), Ringing in ears, and Vitamin D deficiency (02/08/2024).    SURGICAL HISTORY  patient denies any surgical history    FAMILY HISTORY  Family History   Problem Relation Age of Onset    Heart Disease Mother 50    Hyperlipidemia Father     Hypertension Father     Heart Disease Father     Diabetes Father     Diabetes Brother 20    Hyperlipidemia Brother     Hypertension Brother     Heart Disease Brother     Diabetes Maternal Aunt     Diabetes Paternal Aunt        SOCIAL HISTORY  Social History     Tobacco Use    Smoking status: Never     Passive exposure: Never    Smokeless tobacco: Never   Vaping Use    Vaping status: Never Used   Substance and Sexual Activity    Alcohol use: Not Currently    Drug use: Never    Sexual activity: Yes     Partners: Female       CURRENT MEDICATIONS  Home Medications       Reviewed by Jane Hickman R.N. (Registered Nurse) on 02/13/25 at 0603  Med List Status: Not Addressed     Medication Last Dose Status   amoxicillin-clavulanate (AUGMENTIN) 875-125 MG Tab  Active   Ashwagandha 125 MG Cap  Active   atorvastatin (LIPITOR) 10 MG Tab  Active   b complex vitamins capsule  Active   Cholecalciferol (VITAMIN D) 50 MCG (2000 UT) Cap  Active   lisinopril (PRINIVIL) 5 MG Tab  Active   therapeutic multivitamin-minerals (THERAGRAN-M) Tab  Active                  Audit from Redirected Encounters    **Home medications have not yet been reviewed for this encounter**         ALLERGIES  No Known Allergies    PHYSICAL EXAM  VITAL SIGNS: /53   Pulse (!) 129   Temp 35.9 °C (96.7 °F) (Temporal)   Resp 20   Ht 1.676 m (5' 6\")   Wt (!) 166 kg (366 lb)   SpO2 94%   BMI 59.07 kg/m²    Physical Exam  Constitutional:       Appearance: Normal appearance.   HENT:      Head: Normocephalic.      Right Ear: Tympanic membrane normal.      Left Ear: Tympanic membrane " normal.      Nose: Nose normal.      Mouth/Throat:      Mouth: Mucous membranes are moist.   Eyes:      Extraocular Movements: Extraocular movements intact.      Pupils: Pupils are equal, round, and reactive to light.   Cardiovascular:      Rate and Rhythm: Regular rhythm. Tachycardia present.   Pulmonary:      Effort: Pulmonary effort is normal. No respiratory distress.      Breath sounds: Normal breath sounds. No stridor. No wheezing or rales.   Chest:      Chest wall: No tenderness.   Abdominal:      General: Abdomen is flat. There is no distension.      Palpations: Abdomen is soft. There is no mass.      Tenderness: There is no abdominal tenderness.   Musculoskeletal:      Cervical back: Normal range of motion.   Skin:     General: Skin is warm.      Capillary Refill: Capillary refill takes less than 2 seconds.   Neurological:      General: No focal deficit present.      Mental Status: He is alert and oriented to person, place, and time.   Psychiatric:         Mood and Affect: Mood normal.           EKG/LABS  Results for orders placed or performed during the hospital encounter of 02/13/25   CBC WITH DIFFERENTIAL    Collection Time: 02/13/25  6:11 AM   Result Value Ref Range    WBC 10.9 (H) 4.8 - 10.8 K/uL    RBC 5.05 4.70 - 6.10 M/uL    Hemoglobin 15.0 14.0 - 18.0 g/dL    Hematocrit 44.8 42.0 - 52.0 %    MCV 88.7 81.4 - 97.8 fL    MCH 29.7 27.0 - 33.0 pg    MCHC 33.5 32.3 - 36.5 g/dL    RDW 41.1 35.9 - 50.0 fL    Platelet Count 224 164 - 446 K/uL    MPV 11.2 9.0 - 12.9 fL    Neutrophils-Polys 93.10 (H) 44.00 - 72.00 %    Lymphocytes 5.10 (L) 22.00 - 41.00 %    Monocytes 0.90 0.00 - 13.40 %    Eosinophils 0.00 0.00 - 6.90 %    Basophils 0.30 0.00 - 1.80 %    Immature Granulocytes 0.60 0.00 - 0.90 %    Nucleated RBC 0.00 0.00 - 0.20 /100 WBC    Neutrophils (Absolute) 10.13 (H) 1.82 - 7.42 K/uL    Lymphs (Absolute) 0.55 (L) 1.00 - 4.80 K/uL    Monos (Absolute) 0.10 0.00 - 0.85 K/uL    Eos (Absolute) 0.00 0.00 -  0.51 K/uL    Baso (Absolute) 0.03 0.00 - 0.12 K/uL    Immature Granulocytes (abs) 0.06 0.00 - 0.11 K/uL    NRBC (Absolute) 0.00 K/uL   COMP METABOLIC PANEL    Collection Time: 25  6:11 AM   Result Value Ref Range    Sodium 137 135 - 145 mmol/L    Potassium 3.8 3.6 - 5.5 mmol/L    Chloride 107 96 - 112 mmol/L    Co2 17 (L) 20 - 33 mmol/L    Anion Gap 13.0 7.0 - 16.0    Glucose 104 (H) 65 - 99 mg/dL    Bun 18 8 - 22 mg/dL    Creatinine 1.37 0.50 - 1.40 mg/dL    Calcium 9.0 8.5 - 10.5 mg/dL    Correct Calcium 9.1 8.5 - 10.5 mg/dL    AST(SGOT) 26 12 - 45 U/L    ALT(SGPT) 30 2 - 50 U/L    Alkaline Phosphatase 68 30 - 99 U/L    Total Bilirubin 0.5 0.1 - 1.5 mg/dL    Albumin 3.9 3.2 - 4.9 g/dL    Total Protein 7.0 6.0 - 8.2 g/dL    Globulin 3.1 1.9 - 3.5 g/dL    A-G Ratio 1.3 g/dL   LIPASE    Collection Time: 25  6:11 AM   Result Value Ref Range    Lipase 9 (L) 11 - 82 U/L   Troponin    Collection Time: 25  6:11 AM   Result Value Ref Range    Troponin T 10 6 - 19 ng/L   D-DIMER    Collection Time: 25  6:11 AM   Result Value Ref Range    D-Dimer 2.12 (H) 0.00 - 0.50 ug/mL (FEU)   proBrain Natriuretic Peptide, NT    Collection Time: 25  6:11 AM   Result Value Ref Range    NT-proBNP 111 0 - 125 pg/mL   ESTIMATED GFR    Collection Time: 25  6:11 AM   Result Value Ref Range    GFR (CKD-EPI) 69 >60 mL/min/1.73 m 2   TSH WITH REFLEX TO FT4    Collection Time: 25  6:11 AM   Result Value Ref Range    TSH 0.690 0.380 - 5.330 uIU/mL   EKG    Collection Time: 25  6:31 AM   Result Value Ref Range    Report       [facility] Emergency Dept.    Test Date:  2025  Pt Name:    JULIAN MARTIN              Department: ER  MRN:        [id number]                      Room:        05  Gender:     Male                         Technician: [id number]  :        1990                   Requested By:ER TRIAGE PROTOCOL  Order #:    [id number]                    [place] HEARD:  Inge uRssell MD    Measurements  Intervals                                Axis  Rate:       122                          P:          69  OR:         135                          QRS:        -72  QRSD:       87                           T:          41  QT:         319  QTc:        455    Interpretive Statements  EKG is sinus tachycardia, mild left axis deviation, no ST elevation or  depression consistent with acute regional ischemia  Electronically Signed On 02- 06:31:48 PST by Inge Russell MD     LACTIC ACID    Collection Time: 02/13/25  7:15 AM   Result Value Ref Range    Lactic Acid 1.7 0.5 - 2.0 mmol/L   BLOOD CULTURE x2    Collection Time: 02/13/25  7:15 AM    Specimen: Peripheral; Blood   Result Value Ref Range    Significant Indicator NEG     Source BLD     Site PERIPHERAL     Culture Result       No Growth  Note: Blood cultures are incubated for 5 days and  are monitored continuously.Positive blood cultures  are called to the RN and reported as soon as  they are identified.     POC CoV-2, FLU A/B, RSV by PCR    Collection Time: 02/13/25 10:30 AM   Result Value Ref Range    POC Influenza A RNA, PCR Negative Negative    POC Influenza B RNA, PCR Negative Negative    POC RSV, by PCR Negative Negative    POC SARS-CoV-2, PCR NotDetected NotDetected       I have independently interpreted this EKG    RADIOLOGY/PROCEDURES   I have independently interpreted the diagnostic imaging associated with this visit and am waiting the final reading from the radiologist.   My preliminary interpretation is as follows: Questionable cephalization    Radiologist interpretation:  DX-CHEST-PORTABLE (1 VIEW)   Final Result         1.  No acute cardiopulmonary disease.          COURSE & MEDICAL DECISION MAKING    ASSESSMENT, COURSE AND PLAN  Care Narrative: Patient here, tachycardic, with orthopnea and decreased exertional tolerance.  Patient's symptoms are concerning for possible heart failure, pulmonary embolus is also on  the differential.  Sending basic labs, troponin, BNP and D-dimer.  Patient's abdominal exam is benign, my suspicion of surgical pathology is very low, will send basic labs for further risk stratification.  Patient basic labs are all unrevealing.  Troponin is within normal limits.  BNP not severely elevated, patient is obese which may cause a false negative here.  There is a questionable cephalization of patient's chest x-ray.  Patient borderline hypoxic to about 90% on room air.  Patient D-dimer significantly elevated.  Patient CTA fails to reveal any evidence of pneumonia or evidence of pulmonary embolus.  Patient remains tachycardic, is actually borderline hypotensive, given necessity lactic and blood cultures with a respiration of sepsis is low here.  Lactic is normal.  Patient abdominal exam remains entirely benign, he remains without any associate abdominal pain.  He has no associated abdominal mass.  Distal pulses of bilateral lower extremities are 2+.  Patient will require echocardiogram for further workup and to his symptoms and  vitals.  Will discuss case with hospitalist.    Patient is refusing admission.  Viral testing was sent, this is negative.  I discussed with patient that I cannot explain why he has tachycardia, and I do believe that echocardiogram further workup here is necessary.  Patient understands that an incomplete workup could mean we miss something catastrophic, severely debilitating or deadly.  He understands these risks but would like to return home regardless.  Therefore give patient follow-up with cardiology, and I also offered to refill of any of his medications.  He actually states he has active prescriptions but just cannot afford them from his pharmacy.  Therefore I discussed options such as GoodRx and patient will fill with this.  I offered to do meds to beds and see if we can get him significantly decreased medication cost at our pharmacy but patient would like to leave  immediately.  Patient signed AMA paperwork.    Patient also asked us to evaluate his TB test.  This was supposed to be read today but as he may be admitted this might not be able to occur, therefore I read it.  It is less than 1 cm.            DISPOSITION AND DISCUSSIONS  I have discussed management of the patient with the following physicians and RICKIE's: Hospitalist Dr. Delgado Roberson    Escalation of care considered, and ultimately not performed: Admission to hospitalist deferred as patient left AGAINST MEDICAL ADVICE        FINAL DIAGNOSIS  1. Dyspnea, unspecified type        2. Shortness of breath  REFERRAL TO CARDIOLOGY      3. Tachycardia  REFERRAL TO CARDIOLOGY

## 2025-02-13 NOTE — ASSESSMENT & PLAN NOTE
Body mass index is 59.07 kg/m².  Patient would benefit from diet and exercise adjustment when able

## 2025-02-13 NOTE — ASSESSMENT & PLAN NOTE
Due to morbid obesity with a BMI of 59  Patient has had this diagnosis, is now noncompliant with CPAP after losing his job and therefore his insurance and unable to get new supplies  I did discuss the case with case management to see if we can assist, they recommended discussing with respiratory therapy which I did, they were able to look up his machine and get him some supplies that will hopefully he will be able to use until he gets a new job  Patient would benefit from outpatient echocardiogram since he is leaving AGAINST MEDICAL ADVICE

## 2025-02-13 NOTE — ED PROVIDER NOTES
ED Provider Note    CHIEF COMPLAINT  Chief Complaint   Patient presents with    Shortness of Breath     Pt reports he was here early and was going to be admitted, however left AMA. Pt is orthopneic and tachycardic.       EXTERNAL RECORDS REVIEWED  Outpatient notes from 10/17/2024 patient with longstanding history of hypertension, supposed to be on lisinopril and atorvastatin.  Also reviewed previous ED note from earlier today where patient was seen for shortness of breath, progressively worsening.  Gets worse with short bouts of ambulation, had orthopnea, nocturnal dyspnea, noncompliant with medications, CPAP broken.  Patient was also complaining of some left upper quadrant abdominal pain.  Patient's labs here are notable for mild leukocytosis, mild non anion gap metabolic acidosis, D-dimer was elevated lactic acid was normal.  Viral studies were normal.  CTA did not show any evidence of PE.    HPI/ROS  LIMITATION TO HISTORY   Select: : None  OUTSIDE HISTORIAN(S):  None.    Ramakrishna Menezes Jr. is a 34 y.o. male who presents for evaluation of shortness of breath. The patient was seen here earlier today for shortness of breath and abdominal pain. At that time, the patient was orthopneic and tachycardic. Dr. Alcazar recommended hospitalization, but the patient left against medical advice. The patient describes that he feels as though his throat is closing, which is what prompted him to return. The patient states he also has chills, sore throat, cough with sputum, chest pain, generalized abdominal pain, nausea, vomiting, and dysuria. At this time, he is orthopneic, tachycardic and febrile. The patient has not been taking his blood pressure medications for a few months now and his CPAP is broken.    PAST MEDICAL HISTORY   has a past medical history of Apnea, sleep, Frequent headaches, GERD with esophagitis, Hyperlipidemia LDL goal <100 (10/17/2024), IFG (impaired fasting glucose) (02/08/2024), Morbid obesity (HCC)  "(01/23/2024), Peritonsillar abscess (02/08/2024), Plantar fasciitis, bilateral (02/08/2024), Primary hypertension (02/08/2024), Ringing in ears, and Vitamin D deficiency (02/08/2024).      SURGICAL HISTORY  patient denies any surgical history      FAMILY HISTORY  Family History   Problem Relation Age of Onset    Heart Disease Mother 50    Hyperlipidemia Father     Hypertension Father     Heart Disease Father     Diabetes Father     Diabetes Brother 20    Hyperlipidemia Brother     Hypertension Brother     Heart Disease Brother     Diabetes Maternal Aunt     Diabetes Paternal Aunt        SOCIAL HISTORY  Social History     Tobacco Use    Smoking status: Never     Passive exposure: Never    Smokeless tobacco: Never   Vaping Use    Vaping status: Never Used   Substance and Sexual Activity    Alcohol use: Not Currently    Drug use: Never    Sexual activity: Yes     Partners: Female       CURRENT MEDICATIONS  Home Medications       Reviewed by Carmen Maravilla (Pharmacy Tech) on 02/13/25 at 1449  Med List Status: Complete     Medication Last Dose Status   amoxicillin-clavulanate (AUGMENTIN) 875-125 MG Tab 1/23/2025 Active   atorvastatin (LIPITOR) 10 MG Tab Not Taking Active   lisinopril (PRINIVIL) 5 MG Tab Not Taking Active               ALLERGIES  No Known Allergies      PHYSICAL EXAM  VITAL SIGNS: BP (!) 119/100   Pulse (!) 117   Temp (!) 39.1 °C (102.4 °F) (Oral)   Resp (!) 24   Ht 1.676 m (5' 6\")   Wt (!) 166 kg (365 lb 15.4 oz)   SpO2 100%   BMI 59.07 kg/m²      Constitutional: Well developed, Well nourished, Mild distress.   HENT: Normocephalic, Atraumatic, Bilateral external ears normal, Posterior oropharyngeal erythema.  Eyes: Pupils are equal round and reactive, EOMI, Conjunctiva normal, No discharge.   Neck: Normal range of motion, No tenderness, Supple, No stridor. No meningismus.  Lymphatic: No lymphadenopathy noted.   Cardiovascular: Regular rate and rhythm without murmur rub or gallop.  Thorax & " Lungs: Coarse breath sounds bilaterally. Diminished in the bases. There is no chest wall tenderness.   Abdomen: Soft, Mild tenderness to palpation of the left upper quadrant. Non-distended. There is no rebound or guarding. No organomegaly is appreciated. Bowel sounds are normal.  Skin: Normal without rash.   Back: No CVA or spinal tenderness.   Extremities: Intact distal pulses, 1+ pitting edema bilaterally. No tenderness, No cyanosis, No clubbing. Capillary refill is less than 2 seconds.  Musculoskeletal: Good range of motion in all major joints. No tenderness to palpation or major deformities noted.   Neurologic: Alert & oriented x 3, Normal motor function, Normal sensory function, No focal deficits noted. Reflexes are normal.  Psychiatric: Affect normal, Judgment normal, Mood normal. There is no suicidal ideation or patient reported hallucinations.        EKG/LABS  Labs Reviewed   TSH WITH REFLEX TO FT4   URINALYSIS   URINE CULTURE(NEW)   MAGNESIUM   PHOSPHORUS   PROCALCITONIN        Results for orders placed or performed during the hospital encounter of 25   EKG   Result Value Ref Range    Report       [facility] Emergency Dept.    Test Date:  2025  Pt Name:    JULIAN MARTIN              Department: ER  MRN:        [id number]                      Room:  Gender:     Male                         Technician: [id number]  :        1990                   Requested By:ER TRIAGE PROTOCOL  Order #:    [id number]                    Reading MD:    Measurements  Intervals                                Axis  Rate:       127                          P:          65  LA:         133                          QRS:        -66  QRSD:       85                           T:          56  QT:         284  QTc:        413    Interpretive Statements  Sinus tachycardia  Inferior infarct, old  Compared to ECG 2025 06:16:52  Myocardial infarct finding now present  Left-axis deviation no longer  present  ST (T wave) deviation no longer present  Possible ischemia no longer present              RADIOLOGY/PROCEDURES   I have independently interpreted the diagnostic imaging associated with this visit and am waiting the final reading from the radiologist.   My preliminary interpretation is as follows: Cirrhotic morphology of the liver, no evidence of any ascites    Radiologist interpretation:  CT-ABDOMEN-PELVIS WITH   Final Result      1. Cardiomegaly.   2. No cirrhotic morphology of the liver.   3. The remainder of the CT of the abdomen and pelvis with IV contrast is within normal limits.      EC-ECHOCARDIOGRAM COMPLETE W/O CONT    (Results Pending)       COURSE & MEDICAL DECISION MAKING    ASSESSMENT, COURSE AND PLAN  Care Narrative:     2:15 PM - Patient seen and examined at bedside. This is a 34 year old man who presents to the emergency department for evaluation of shortness of breath. The patient was seen here earlier today, was meant to be hospitalized and left against medical advice. Discussed plan of care, including performing an EKG, lab work and imaging. Also discussed with the patient that he will require hospitalization. He was given an opportunity to ask questions. He is agreeable and understanding to the plan of care. The patient will be resuscitated with 1L LR IV and medicated with Rocephin 2,000 mg and Tylenol 650 mg. Ordered for EKG, TSH w/ Reflex to FT4, UA, Procalcitonin, Phosphorus, Magnesium, Urine Culture, and CT-Abdomen-Pelvis w/ to evaluate his symptoms.      Labs reviewed showed elevated procalcitonin, mild hypomagnesia and anemia, normal TSH, urine did show trace leukocyte Estrace with small amount of white blood cells and bacteria.  CT of the abdomen pelvis was largely normal besides a cirrhotic appearing liver with no evidence of any ascites.  Differential diagnosis considered.  Due to new onset fever concern for sepsis, patient was treated empirically with broad-spectrum  antibiotics, due to concerns over fluid overload patient did not receive full 30 cc/kg fluid bolus but did receive a liter of IV fluids, had some improvement in his vital signs including tachycardia and fever after defervesced     3:36 PM - Spoke with Dr. Dc (Hospitalist) about the patient's condition. He will hospitalize the patient for further evaluation and care.         Hydration: Based on the patient's presentation of Tachycardia the patient was given IV fluids. IV Hydration was used because oral hydration was not adequate alone. Upon recheck following hydration, the patient was improved.    ADDITIONAL PROBLEMS MANAGED  Sepsis    DISPOSITION AND DISCUSSIONS  I have discussed management of the patient with the following physicians and RICKIE's:  Dr. Dc (Hospitalist)    Discussion of management with other [facility] or appropriate source(s): None         DISPOSITION:  Patient will be hospitalized by Dr. Dc (Hospitalist) in guarded condition.     FINAL DIAGNOSIS  1. Dyspnea, unspecified type Acute   2. Tachycardia Acute   3. Fever, unspecified fever cause Acute      Deb BOOTHE (Scribe), am scribing for, and in the presence of, Ja Oswald M.D..    Electronically signed by: Deb Leon (Scribe), 2/13/2025    IJa M.D. personally performed the services described in this documentation, as scribed by Deb Leon in my presence, and it is both accurate and complete.     Electronically signed by: Ja Oswald M.D., 2/13/2025 1:57 PM

## 2025-02-13 NOTE — ED TRIAGE NOTES
Chief Complaint   Patient presents with    Abdominal Pain     Pt reports abd pain LUQ since yesterday around 1630. Pt endorses n/v, denies diarrhea. Pt also endorses dysuria.    Shortness of Breath     Pt endorses SOB x3 hrs, Pt 90% on RA with EMS, place don 2L NC up to 95%.      Pt BIBA from home for above. Pt received 4mg zofran, 50mcg fentanyl en route.

## 2025-02-13 NOTE — ASSESSMENT & PLAN NOTE
Persists, at times into the 130s, during my exam it was greater than 100  Remains sinus  Viral panel negative  Ideally, would obtain echocardiogram but patient is leaving AGAINST MEDICAL ADVICE

## 2025-02-14 ENCOUNTER — PHARMACY VISIT (OUTPATIENT)
Dept: PHARMACY | Facility: MEDICAL CENTER | Age: 35
End: 2025-02-14
Payer: COMMERCIAL

## 2025-02-14 VITALS
TEMPERATURE: 97.7 F | WEIGHT: 315 LBS | SYSTOLIC BLOOD PRESSURE: 125 MMHG | OXYGEN SATURATION: 94 % | DIASTOLIC BLOOD PRESSURE: 71 MMHG | BODY MASS INDEX: 50.62 KG/M2 | RESPIRATION RATE: 18 BRPM | HEART RATE: 96 BPM | HEIGHT: 66 IN

## 2025-02-14 LAB
ALBUMIN SERPL BCP-MCNC: 3.7 G/DL (ref 3.2–4.9)
ALP SERPL-CCNC: 61 U/L (ref 30–99)
ALT SERPL-CCNC: 49 U/L (ref 2–50)
ANION GAP SERPL CALC-SCNC: 11 MMOL/L (ref 7–16)
AST SERPL-CCNC: 42 U/L (ref 12–45)
BILIRUB CONJ SERPL-MCNC: <0.2 MG/DL (ref 0.1–0.5)
BILIRUB INDIRECT SERPL-MCNC: NORMAL MG/DL (ref 0–1)
BILIRUB SERPL-MCNC: 0.5 MG/DL (ref 0.1–1.5)
BUN SERPL-MCNC: 11 MG/DL (ref 8–22)
CALCIUM SERPL-MCNC: 8.5 MG/DL (ref 8.5–10.5)
CHLORIDE SERPL-SCNC: 106 MMOL/L (ref 96–112)
CO2 SERPL-SCNC: 19 MMOL/L (ref 20–33)
CREAT SERPL-MCNC: 1.13 MG/DL (ref 0.5–1.4)
ERYTHROCYTE [DISTWIDTH] IN BLOOD BY AUTOMATED COUNT: 42.3 FL (ref 35.9–50)
GFR SERPLBLD CREATININE-BSD FMLA CKD-EPI: 87 ML/MIN/1.73 M 2
GLUCOSE SERPL-MCNC: 115 MG/DL (ref 65–99)
HCT VFR BLD AUTO: 43.6 % (ref 42–52)
HGB BLD-MCNC: 14.3 G/DL (ref 14–18)
MAGNESIUM SERPL-MCNC: 2.4 MG/DL (ref 1.5–2.5)
MCH RBC QN AUTO: 29.5 PG (ref 27–33)
MCHC RBC AUTO-ENTMCNC: 32.8 G/DL (ref 32.3–36.5)
MCV RBC AUTO: 89.9 FL (ref 81.4–97.8)
PLATELET # BLD AUTO: 151 K/UL (ref 164–446)
PMV BLD AUTO: 11.3 FL (ref 9–12.9)
POTASSIUM SERPL-SCNC: 4.3 MMOL/L (ref 3.6–5.5)
PROCALCITONIN SERPL-MCNC: 18.8 NG/ML
PROT SERPL-MCNC: 6.7 G/DL (ref 6–8.2)
RBC # BLD AUTO: 4.85 M/UL (ref 4.7–6.1)
SCCMEC + MECA PNL NOSE NAA+PROBE: NEGATIVE
SODIUM SERPL-SCNC: 136 MMOL/L (ref 135–145)
WBC # BLD AUTO: 10.8 K/UL (ref 4.8–10.8)

## 2025-02-14 PROCEDURE — 700102 HCHG RX REV CODE 250 W/ 637 OVERRIDE(OP): Performed by: STUDENT IN AN ORGANIZED HEALTH CARE EDUCATION/TRAINING PROGRAM

## 2025-02-14 PROCEDURE — 83735 ASSAY OF MAGNESIUM: CPT

## 2025-02-14 PROCEDURE — 80076 HEPATIC FUNCTION PANEL: CPT

## 2025-02-14 PROCEDURE — RXMED WILLOW AMBULATORY MEDICATION CHARGE: Performed by: STUDENT IN AN ORGANIZED HEALTH CARE EDUCATION/TRAINING PROGRAM

## 2025-02-14 PROCEDURE — 99239 HOSP IP/OBS DSCHRG MGMT >30: CPT | Performed by: STUDENT IN AN ORGANIZED HEALTH CARE EDUCATION/TRAINING PROGRAM

## 2025-02-14 PROCEDURE — A9270 NON-COVERED ITEM OR SERVICE: HCPCS | Performed by: STUDENT IN AN ORGANIZED HEALTH CARE EDUCATION/TRAINING PROGRAM

## 2025-02-14 PROCEDURE — 94660 CPAP INITIATION&MGMT: CPT

## 2025-02-14 PROCEDURE — 700111 HCHG RX REV CODE 636 W/ 250 OVERRIDE (IP): Performed by: STUDENT IN AN ORGANIZED HEALTH CARE EDUCATION/TRAINING PROGRAM

## 2025-02-14 PROCEDURE — RXMED WILLOW AMBULATORY MEDICATION CHARGE: Performed by: NURSE PRACTITIONER

## 2025-02-14 PROCEDURE — 84145 PROCALCITONIN (PCT): CPT

## 2025-02-14 PROCEDURE — 80048 BASIC METABOLIC PNL TOTAL CA: CPT

## 2025-02-14 PROCEDURE — 85027 COMPLETE CBC AUTOMATED: CPT

## 2025-02-14 PROCEDURE — 700105 HCHG RX REV CODE 258: Performed by: STUDENT IN AN ORGANIZED HEALTH CARE EDUCATION/TRAINING PROGRAM

## 2025-02-14 RX ADMIN — PIPERACILLIN AND TAZOBACTAM 4.5 G: 4; .5 INJECTION, POWDER, FOR SOLUTION INTRAVENOUS at 04:58

## 2025-02-14 RX ADMIN — ACETAMINOPHEN 650 MG: 325 TABLET ORAL at 04:12

## 2025-02-14 RX ADMIN — SODIUM CHLORIDE, POTASSIUM CHLORIDE, SODIUM LACTATE AND CALCIUM CHLORIDE: 600; 310; 30; 20 INJECTION, SOLUTION INTRAVENOUS at 09:46

## 2025-02-14 RX ADMIN — VANCOMYCIN HYDROCHLORIDE 2000 MG: 5 INJECTION, POWDER, LYOPHILIZED, FOR SOLUTION INTRAVENOUS at 05:02

## 2025-02-14 ASSESSMENT — PAIN DESCRIPTION - PAIN TYPE
TYPE: ACUTE PAIN

## 2025-02-14 ASSESSMENT — FIBROSIS 4 INDEX: FIB4 SCORE: 0.74

## 2025-02-14 NOTE — H&P
Hospital Medicine History & Physical Note    Date of Service  2/13/2025    Primary Care Physician  ALICIA Dowell.        Code Status  Full Code    Chief Complaint  Chief Complaint   Patient presents with    Shortness of Breath     Pt reports he was here early and was going to be admitted, however left AMA. Pt is orthopneic and tachycardic.       History of Presenting Illness  Ramakrishna Menezes Jr. is a 34 y.o. male with past medical history of sleep apnea, morbid obesity, hyperlipidemia, hypertension who presented 2/13/2025 with 2-day history of worsening shortness of breath and productive cough cough associated with fever and chills.  He was evaluated in the ED earlier and left AMA.  On my evaluation patient remained tachycardic, tachypneic, febrile Tmax 102.4 Fahrenheit, currently does not appear to be in acute distress, able to complete full sentence, labs remarkable for leukocytosis white count 10.9 K, hemoglobin 15K, sodium 137 potassium 3.8, renal function is stable, bicarbonate 17 magnesium 1.4, significantly elevated procalcitonin 14, UA pending, subsequent CT chest with no PE, noted hepatomegaly, cirrhosis.  CT abdomen pelvis as ordered for further evaluation.Patient will be admitted to the hospital for further evaluation and management for suspected sepsis likely due to pneumonia.    I spoke and discussed the case with the ER physician, Dr. Oswald.  I discussed the plan of care with patient and family.    Review of Systems  Review of Systems   Constitutional:  Positive for fever.   Respiratory:  Positive for cough and shortness of breath.    Gastrointestinal:  Negative for diarrhea, nausea and vomiting.   Genitourinary:  Negative for dysuria and flank pain.       Past Medical History   has a past medical history of Apnea, sleep, Frequent headaches, GERD with esophagitis, Hyperlipidemia LDL goal <100 (10/17/2024), IFG (impaired fasting glucose) (02/08/2024), Morbid obesity (HCC) (01/23/2024),  Peritonsillar abscess (02/08/2024), Plantar fasciitis, bilateral (02/08/2024), Primary hypertension (02/08/2024), Ringing in ears, and Vitamin D deficiency (02/08/2024).    Surgical History   has no past surgical history on file.     Family History  family history includes Diabetes in his father, maternal aunt, and paternal aunt; Diabetes (age of onset: 20) in his brother; Heart Disease in his brother and father; Heart Disease (age of onset: 50) in his mother; Hyperlipidemia in his brother and father; Hypertension in his brother and father.   Family history reviewed with patient. There is no family history that is pertinent to the chief complaint.     Social History   reports that he has never smoked. He has never been exposed to tobacco smoke. He has never used smokeless tobacco. He reports that he does not currently use alcohol. He reports that he does not use drugs.    Allergies  No Known Allergies    Medications  Prior to Admission Medications   Prescriptions Last Dose Informant Patient Reported? Taking?   amoxicillin-clavulanate (AUGMENTIN) 875-125 MG Tab 1/23/2025 Historical No No   Sig: Take 1 tablet by mouth every 12 hours for 7 days   Patient taking differently: PRESCRIPTION COURSE WAS COMPLETED   atorvastatin (LIPITOR) 10 MG Tab Not Taking Historical No No   Sig: Take 1 Tablet by mouth every 48 hours.   Patient not taking: Reported on 2/13/2025   lisinopril (PRINIVIL) 5 MG Tab Not Taking Historical No No   Sig: Take 1 Tablet by mouth every day.   Patient not taking: Reported on 2/13/2025      Facility-Administered Medications: None       Physical Exam  Temp:  [35.9 °C (96.7 °F)-40.1 °C (104.1 °F)] 39.1 °C (102.4 °F)  Pulse:  [100-131] 119  Resp:  [10-25] 25  BP: ()/() 139/65  SpO2:  [90 %-100 %] 94 %  Blood Pressure: 139/65   Temperature: (!) 39.1 °C (102.4 °F)   Pulse: (!) 119   Respiration: (!) 25   Pulse Oximetry: 94 %       Physical Exam  Constitutional:       Appearance: He is obese. He is  ill-appearing.   Cardiovascular:      Rate and Rhythm: Tachycardia present.      Pulses: Normal pulses.   Pulmonary:      Effort: Pulmonary effort is normal. No respiratory distress.      Breath sounds: No wheezing.      Comments: Bilateral decreased in breath sound, auscultation limited due to morbid obesity  Musculoskeletal:      Right lower leg: No edema.      Left lower leg: No edema.   Neurological:      General: No focal deficit present.      Mental Status: He is alert and oriented to person, place, and time.   Psychiatric:         Mood and Affect: Mood normal.         Laboratory:  Recent Labs     02/13/25  0611   WBC 10.9*   RBC 5.05   HEMOGLOBIN 15.0   HEMATOCRIT 44.8   MCV 88.7   MCH 29.7   MCHC 33.5   RDW 41.1   PLATELETCT 224   MPV 11.2     Recent Labs     02/13/25  0611   SODIUM 137   POTASSIUM 3.8   CHLORIDE 107   CO2 17*   GLUCOSE 104*   BUN 18   CREATININE 1.37   CALCIUM 9.0     Recent Labs     02/13/25  0611   ALTSGPT 30   ASTSGOT 26   ALKPHOSPHAT 68   TBILIRUBIN 0.5   LIPASE 9*   GLUCOSE 104*         Recent Labs     02/13/25  0611   NTPROBNP 111         Recent Labs     02/13/25  0611   TROPONINT 10       Imaging:  CT-ABDOMEN-PELVIS WITH    (Results Pending)       X-Ray:  I have personally reviewed the images and compared with prior images.  EKG:  I have personally reviewed the images and compared with prior images.    Assessment/Plan:  Justification for Admission Status  I anticipate this patient will require at least two midnights for appropriate medical management, necessitating inpatient admission for further evaluation and management for suspected sepsis likely due to pneumonia.Need close monitoring for toxicity while on IV antibiotics with close monitoring of renal function.  Need close telemetry monitoring for possibility for high risk for deterioration due to ongoing severe medical issues, need close hemodynamic monitoring, need daily laboratory testing including CBC, BMP .  Need workup for  suspected sepsis.            * Acute pneumonia- (present on admission)  Assessment & Plan  Patient with productive cough and fever, elevated procalcitonin  Though CTA chest not clear for pneumonia, given his clinical symptoms , will treat as pneumonia  Also getting CT abdomen pelvis to rule out other source of infection    Received monitor bolus, continue maintenance IV fluid  Initiated broad-spectrum IV antibiotics which includes vancomycin and Zosyn  Obtain full viral panel  Follow blood culture x 2, sputum culture  Monitor oxygen requirement closely, keep oxygen saturation above 92%  Repeat CBC in a.m. to monitor white count and hemoglobin  Repeat BMP in am to monitor electrolytes and renal function due to potential toxicity from IV antibiotics  Close monitoring for response to antibiotic and ongoing need for source control  Close monitoring of vital signs, urine output, mental status   Adjust antibiotic based on culture result and sensitivity testing        Hypomagnesemia  Assessment & Plan  IV magnesium given  Repeat labs in a.m.    SIRS (systemic inflammatory response syndrome) (HCC)  Assessment & Plan  SIRS criteria identified on my evaluation include:  Fever, with temperature greater than 100.9 deg F, Tachycardia, with heart rate greater than 90 BPM, and Tachypnea, with respirations greater than 20 per minute    Currently does not meet sepsis criteria  Started on broad-spectrum regarding including IV vancomycin and Zosyn  Further work needed to identify source of infection  Currently getting treatment for pneumonia  Follow blood culture  Pending UA    Obstructive sleep apnea- (present on admission)  Assessment & Plan  Noncompliant with CPAP due to insurance issue    Primary hypertension- (present on admission)  Assessment & Plan  Current blood pressure is stable  Hold antihypertensive for now  Resume as needed    Morbid obesity (HCC)- (present on admission)  Assessment & Plan  Body mass index is 59.07  kg/m².  Outpatient referral to bariatric clinic          VTE prophylaxis: SCDs/TEDs and enoxaparin ppx

## 2025-02-14 NOTE — PROGRESS NOTES
Ramakrishna Menezes Jr. has been provided discharge instructions, to include follow up care, home medications, and activity/diet reviewed. Copy of discharge instructions in patient chart, signed and reviewed. Patient verbalizes the understanding of the discharge instructions. Arm band removed. Patient did not have home meds during admit. Meds to Beds given. Questions and concerns addressed prior to leaving the discharge lounge. Transported via car by wife. Patient discharge to home.

## 2025-02-14 NOTE — HOSPITAL COURSE
Ramakrishna Menezes is a 34-year-old male with PMHx MOHSEN, morbid obesity, HLD, HTN.  Admitted 2/13 for shortness of breath and cough.    Per history-patient had been evaluated in the ED early in the day 2/13 but left AMA.  He again presented with worsening shortness of breath, fever of 102.4.  Procalcitonin elevated at 14.  CT chest: No PE; notable hepatomegaly and cirrhosis.  CT A/P: Cardiomegaly.  No cirrhotic morphology noted.  Admitted for pneumonia.    Patient did not require supplemental oxygen during his hospitalization.  He had a home O2 evaluation prior to discharge.  His oxygen saturation was 92% with ambulation.  I have transition patient to p.o. antibiotics.  He will continue Augmentin for the next 6 days.  Patient will follow-up with primary care physician in the next week.

## 2025-02-14 NOTE — PROGRESS NOTES
"Pharmacy Vancomycin Kinetics Note for 2/13/2025     34 y.o. male on Vancomycin day #  1     Vancomycin Indication (AUC Dosing): Sepsis  Provider specified end date:  (TBD)    Active Antibiotics (From admission, onward)      Ordered     Ordering Provider       Thu Feb 13, 2025  4:48 PM    02/13/25 1648  vancomycin (Vancocin) 3,000 mg in  mL IVPB  (vancomycin (VANCOCIN) IV (LD + Maintenance))  ONCE         Dino Dc M.D.       Thu Feb 13, 2025  4:26 PM    02/13/25 1626  MD Alert...Vancomycin per Pharmacy  (MD Alert...Vancomycin per Pharmacy)  PHARMACY TO DOSE        Question:  Indication(s) for vancomycin?  Answer:  Unknown source of infection    Dino Dc M.D.    02/13/25 1626  piperacillin-tazobactam (Zosyn) 4.5 g in  mL IVPB  (piperacillin-tazobactam (ZOSYN) IV (Extended-infusion) PANEL )  ONCE        Placed in \"And\" Linked Group    Dino Dc M.D.    02/13/25 1626  piperacillin-tazobactam (Zosyn) 4.5 g in  mL IVPB  (piperacillin-tazobactam (ZOSYN) IV (Extended-infusion) PANEL )  EVERY 8 HOURS        Placed in \"And\" Linked Group    Dino Dc M.D.            Dosing Weight: 166 kg (365 lb 15.4 oz)      Admission History: Admitted on 2/13/2025 for Acute pneumonia [J18.9]  Pertinent history: Admitted 2/13 for 2-day history of worsening shortness of breath and productive cough cough associated with fever/chills. Started on braod-spectrum antibiotics for concern of PNA. Of note, pt recently completed course of Augmentin within the past month - querry adequate dosign given body habitus. Cultures ordered/in process.    Allergies:     Patient has no known allergies.     Pertinent cultures to date:     Results       Procedure Component Value Units Date/Time    CULTURE RESPIRATORY W/ GRM STN [[id number]]     Order Status: Sent Specimen: Respirate from Sputum     Respiratory Panel by PCR (Inpatient ONLY) [[id number]]     Order Status: Sent Specimen: Nasopharyngeal     MRSA By PCR (Amp) [[id number]]  " "   Order Status: Sent Specimen: Respirate from Nares     Urinalysis [[id number]]     Order Status: Canceled Specimen: Urine     Urine Culture (New) [[id number]]     Order Status: Sent Specimen: Urine     Blood Culture - Draw one from central line and one from peripheral site [[id number]]     Order Status: Canceled Specimen: Blood from Peripheral     Blood Culture - Draw one from central line and one from peripheral site [[id number]]     Order Status: Canceled Specimen: Blood from Line     URINALYSIS (UA) [[id number]]     Order Status: Sent Specimen: Urine             Labs:     Estimated Creatinine Clearance: 111.8 mL/min (by C-G formula based on SCr of 1.37 mg/dL).  Recent Labs     25  0611   WBC 10.9*   NEUTSPOLYS 93.10*     Recent Labs     25  0611   BUN 18   CREATININE 1.37   ALBUMIN 3.9     No intake or output data in the 24 hours ending 25 1749   /62   Pulse (!) 105   Temp (!) 39.1 °C (102.4 °F) (Oral)   Resp 20   Ht 1.676 m (5' 6\")   Wt (!) 166 kg (365 lb 15.4 oz)   SpO2 93%  Temp (24hrs), Av.6 °C (103.3 °F), Min:39.1 °C (102.4 °F), Max:40.1 °C (104.1 °F)      List concerns for Vancomycin clearance:     Obesity    Pharmacokinetics:     AUC kinetics:   Ke (hr ^-1): 0.2044 hr^-1  Half life: 3.39 hr  Clearance: 7.857  Estimated TDD: 3928.5  Estimated Dose: 884  Estimated interval: 5.4    Two level kinetics:   Ke (hr ^-1):    Half life:    Vd:    Calculated AUC:   mg·hr/L    Trough kinetics:   No results for input(s): \"VANCOTROUGH\", \"VANCOPEAK\", \"VANCORANDOM\" in the last 72 hours.    A/P:     -  Vancomycin dose: 2000mg iv q12h (0600, 1800)    -  Next vancomycin level(s): 2-3 days    -  Predicted vancomycin AUC from initial AUC test calculator: 509 mg·hr/L    -  Comments: Empiric vancomycin for possible PNA, cultures ordered/in process. Renal function slightly elevated from baseline, anticipate improvement with fluids; concern for accumulation with body habitus, would favor early levels " should therapy continue/renal function not improve. Will initiate vancomycin at 12mg/kg iv q12h and tentatively plan to check levels once at steady state. Pharmacy will continue to follow.     Marika Arguello, PharmD, BCCCP

## 2025-02-14 NOTE — PROGRESS NOTES
Received bedside report from ELDA Husain, pt care assumed. VS stable, Pt A&Ox4, c/o 0/10 pain at this time. Bed locked and in lowest position, bed alarm off, pt up-self

## 2025-02-14 NOTE — PROGRESS NOTES
4 Eyes Skin Assessment Completed by ELDA Dai and NAVID Lawrence.    Head WDL  Ears WDL  Nose WDL  Mouth WDL  Neck WDL  Breast/Chest WDL  Shoulder Blades WDL  Spine WDL  (R) Arm/Elbow/Hand WDL, dry  (L) Arm/Elbow/Hand dry, scab  Abdomen WDL  Groin WDL  Scrotum/Coccyx/Buttocks WDL  (R) Leg WDL  (L) Leg WDL  (R) Heel/Foot/Toe Blanching, redness heel  (L) Heel/Foot/Toe Blanching, redness heel          Devices In Places Tele Box and Pulse Ox      Interventions In Place NC W/Ear Foams and Pillows    Possible Skin Injury No    Pictures Uploaded Into Epic N/A  Wound Consult Placed N/A  RN Wound Prevention Protocol Ordered No

## 2025-02-14 NOTE — DISCHARGE SUMMARY
Discharge Summary    CHIEF COMPLAINT ON ADMISSION  Chief Complaint   Patient presents with    Shortness of Breath     Pt reports he was here early and was going to be admitted, however left AMA. Pt is orthopneic and tachycardic.       Reason for Admission  SOB     Admission Date  2/13/2025    CODE STATUS  Full Code    HPI & HOSPITAL COURSE    Ramakrishna Menezes is a 34-year-old male with PMHx MOHSEN, morbid obesity, HLD, HTN.  Admitted 2/13 for shortness of breath and cough.    Per history-patient had been evaluated in the ED early in the day 2/13 but left AMA.  He again presented with worsening shortness of breath, fever of 102.4.  Procalcitonin elevated at 14.  CT chest: No PE; notable hepatomegaly and cirrhosis.  CT A/P: Cardiomegaly.  No cirrhotic morphology noted.  Admitted for pneumonia.    Patient did not require supplemental oxygen during his hospitalization.  He had a home O2 evaluation prior to discharge.  His oxygen saturation was 92% with ambulation.  I have transition patient to p.o. antibiotics.  He will continue Augmentin for the next 6 days.  Patient will follow-up with primary care physician in the next week.    Therefore, he is discharged in good and stable condition to home with close outpatient follow-up.    The patient recovered much more quickly than anticipated on admission.    Discharge Date  2/14/2025    FOLLOW UP ITEMS POST DISCHARGE  Follow-up with primary care physician 1 week    DISCHARGE DIAGNOSES  Principal Problem:    Acute pneumonia (POA: Yes)  Active Problems:    Morbid obesity (HCC) (POA: Yes)    Primary hypertension (POA: Yes)    Obstructive sleep apnea (POA: Yes)    SIRS (systemic inflammatory response syndrome) (HCC) (POA: Unknown)    Hypomagnesemia (POA: Unknown)  Resolved Problems:    * No resolved hospital problems. *      FOLLOW UP  No future appointments.  [facility]  [address]  [place] [address]  [phone]  Follow up  Please call [facility]  [place] to establish with a Primary Care Provider. This office offers sliding fee scales based on income. Thank you.    [facility]  [address] [place] [address]  [phone]  Follow up  This is a walk-in clinic. Patients are seen on a first come, first served basis, wait times may vary. This clinic offers a sliding-fee scale.    Hours: Monday -Friday 8:00am-4:00pm, closed for lunch from 1pm-2pm.      MEDICATIONS ON DISCHARGE     Medication List        START taking these medications        Instructions   atorvastatin 10 MG Tabs  Commonly known as: Lipitor   Take 1 Tablet by mouth every 48 hours.  (Take 1 Tablet by mouth every 48 hours.)  Dose: 10 mg     lisinopril 5 MG Tabs  Commonly known as: Prinivil   Take 1 Tablet by mouth every day.  Dose: 5 mg            CHANGE how you take these medications        Instructions   amoxicillin-clavulanate 875-125 MG Tabs  What changed:   how much to take  how to take this  when to take this  Commonly known as: Augmentin   Take 1 Tablet by mouth 2 times a day for 4 days.  Dose: 1 Tablet              Allergies  No Known Allergies    DIET  Orders Placed This Encounter   Procedures    Diet Order Diet: Regular     Standing Status:   Standing     Number of Occurrences:   1     Diet::   Regular [1]    Discontinue Diet Tray     Standing Status:   Standing     Number of Occurrences:   1       ACTIVITY  As tolerated.  Weight bearing as tolerated    CONSULTATIONS  None     PROCEDURES  None     LABORATORY  Lab Results   Component Value Date    SODIUM 136 02/14/2025    POTASSIUM 4.3 02/14/2025    CHLORIDE 106 02/14/2025    CO2 19 (L) 02/14/2025    GLUCOSE 115 (H) 02/14/2025    BUN 11 02/14/2025    CREATININE 1.13 02/14/2025        Lab Results   Component Value Date    WBC 10.8 02/14/2025    HEMOGLOBIN 14.3 02/14/2025    HEMATOCRIT 43.6 02/14/2025    PLATELETCT 151 (L) 02/14/2025        Total time of the discharge process exceeds 46 minutes.

## 2025-02-14 NOTE — CARE PLAN
The patient is Stable - Low risk of patient condition declining or worsening    Shift Goals  Clinical Goals: monitor O2  Patient Goals: feel better  Family Goals: determine source of infection, plan of care    Progress made toward(s) clinical / shift goals:    Problem: Knowledge Deficit - Standard  Goal: Patient and family/care givers will demonstrate understanding of plan of care, disease process/condition, diagnostic tests and medications  Outcome: Progressing     Problem: Pain - Standard  Goal: Alleviation of pain or a reduction in pain to the patient’s comfort goal  Outcome: Progressing     Problem: Hemodynamics  Goal: Patient's hemodynamics, fluid balance and neurologic status will be stable or improve  Outcome: Progressing     Problem: Respiratory  Goal: Patient will achieve/maintain optimum respiratory ventilation and gas exchange  Outcome: Progressing  Note: Pt using NOC CPAP.

## 2025-02-14 NOTE — ASSESSMENT & PLAN NOTE
SIRS criteria identified on my evaluation include:  Fever, with temperature greater than 100.9 deg F, Tachycardia, with heart rate greater than 90 BPM, and Tachypnea, with respirations greater than 20 per minute    Currently does not meet sepsis criteria  Started on broad-spectrum regarding including IV vancomycin and Zosyn  Further work needed to identify source of infection  Currently getting treatment for pneumonia  Follow blood culture  Pending UA

## 2025-02-15 LAB
BACTERIA UR CULT: NORMAL
SIGNIFICANT IND 70042: NORMAL
SITE SITE: NORMAL
SOURCE SOURCE: NORMAL

## 2025-02-17 LAB
GAMMA INTERFERON BACKGROUND BLD IA-ACNC: 1.58 IU/ML
M TB IFN-G BLD-IMP: ABNORMAL
M TB IFN-G CD4+ BCKGRND COR BLD-ACNC: 0.03 IU/ML
MITOGEN IGNF BCKGRD COR BLD-ACNC: 0.45 IU/ML
QFT TB2 - NIL TBQ2: -0.09 IU/ML

## 2025-02-19 ENCOUNTER — TELEPHONE (OUTPATIENT)
Dept: HEALTH INFORMATION MANAGEMENT | Facility: OTHER | Age: 35
End: 2025-02-19